# Patient Record
Sex: MALE | Race: WHITE | NOT HISPANIC OR LATINO | Employment: FULL TIME | URBAN - METROPOLITAN AREA
[De-identification: names, ages, dates, MRNs, and addresses within clinical notes are randomized per-mention and may not be internally consistent; named-entity substitution may affect disease eponyms.]

---

## 2017-01-18 ENCOUNTER — MYC MEDICAL ADVICE (OUTPATIENT)
Dept: FAMILY MEDICINE | Facility: CLINIC | Age: 52
End: 2017-01-18

## 2017-01-18 DIAGNOSIS — E34.9 HYPOTESTOSTERONISM: ICD-10-CM

## 2017-01-18 DIAGNOSIS — R53.83 OTHER FATIGUE: Primary | ICD-10-CM

## 2017-01-18 DIAGNOSIS — E03.9 HYPOTHYROIDISM, UNSPECIFIED TYPE: ICD-10-CM

## 2017-01-21 DIAGNOSIS — L70.9 ACNE: ICD-10-CM

## 2017-01-21 DIAGNOSIS — L71.9 ROSACEA: Primary | ICD-10-CM

## 2017-01-23 RX ORDER — MINOCYCLINE HYDROCHLORIDE 100 MG/1
100 CAPSULE ORAL DAILY
Qty: 60 CAPSULE | Refills: 5 | Status: SHIPPED | OUTPATIENT
Start: 2017-01-23 | End: 2017-10-12

## 2017-01-23 NOTE — TELEPHONE ENCOUNTER
minocycline      Last Written Prescription Date: 1/6/2016  Last Fill Quantity: 60,  # refills: 5   Last Office Visit with [facility], P or [facility] prescribing provider: 12/2/2016                                         Next 5 appointments (look out 90 days)     Jan 26, 2017  8:15 AM   [facility] visit with [facility]   [facility]    [address] [place] [id number]   [phone]                  Prescription approved per [facility] Refill Protocol.  Palak Dsouza RN

## 2017-01-26 DIAGNOSIS — E34.9 HYPOTESTOSTERONISM: ICD-10-CM

## 2017-01-26 DIAGNOSIS — E03.9 HYPOTHYROIDISM, UNSPECIFIED TYPE: ICD-10-CM

## 2017-01-26 DIAGNOSIS — R53.83 OTHER FATIGUE: ICD-10-CM

## 2017-01-26 LAB
CORTIS SERPL-MCNC: 6.6 UG/DL (ref 4–22)
T3FREE SERPL-MCNC: 3.3 PG/ML (ref 2.3–4.2)
T4 FREE SERPL-MCNC: 0.9 NG/DL (ref 0.76–1.46)
TSH SERPL DL<=0.05 MIU/L-ACNC: 16.18 MU/L (ref 0.4–4)

## 2017-01-26 PROCEDURE — 82088 ASSAY OF ALDOSTERONE: CPT | Mod: 90 | Performed by: FAMILY MEDICINE

## 2017-01-26 PROCEDURE — 99000 SPECIMEN HANDLING OFFICE-LAB: CPT | Performed by: FAMILY MEDICINE

## 2017-01-26 PROCEDURE — 84481 FREE ASSAY (FT-3): CPT | Performed by: FAMILY MEDICINE

## 2017-01-26 PROCEDURE — 82533 TOTAL CORTISOL: CPT | Performed by: FAMILY MEDICINE

## 2017-01-26 PROCEDURE — 84270 ASSAY OF SEX HORMONE GLOBUL: CPT | Performed by: FAMILY MEDICINE

## 2017-01-26 PROCEDURE — 84439 ASSAY OF FREE THYROXINE: CPT | Performed by: FAMILY MEDICINE

## 2017-01-26 PROCEDURE — 84443 ASSAY THYROID STIM HORMONE: CPT | Performed by: FAMILY MEDICINE

## 2017-01-26 PROCEDURE — 82627 DEHYDROEPIANDROSTERONE: CPT | Performed by: FAMILY MEDICINE

## 2017-01-26 PROCEDURE — 36415 COLL VENOUS BLD VENIPUNCTURE: CPT | Performed by: FAMILY MEDICINE

## 2017-01-26 PROCEDURE — 84403 ASSAY OF TOTAL TESTOSTERONE: CPT | Performed by: FAMILY MEDICINE

## 2017-01-27 LAB
ALDOST SERPL-MCNC: 27.6 NG/DL
DHEA-S SERPL-MCNC: 291 UG/DL (ref 80–560)

## 2017-01-29 LAB
SHBG SERPL-SCNC: 26 NMOL/L (ref 11–80)
TESTOST FREE SERPL-MCNC: 19.26 NG/DL (ref 4.7–24.4)
TESTOST SERPL-MCNC: 752 NG/DL (ref 240–950)

## 2017-03-22 ENCOUNTER — TELEPHONE (OUTPATIENT)
Dept: FAMILY MEDICINE | Facility: CLINIC | Age: 52
End: 2017-03-22

## 2017-03-22 DIAGNOSIS — E34.9 HYPOTESTOSTERONISM: ICD-10-CM

## 2017-03-22 DIAGNOSIS — E03.9 HYPOTHYROIDISM, UNSPECIFIED TYPE: Primary | ICD-10-CM

## 2017-03-22 DIAGNOSIS — E03.9 HYPOTHYROIDISM, UNSPECIFIED TYPE: ICD-10-CM

## 2017-03-22 DIAGNOSIS — E34.9 HYPOTESTOSTERONISM: Primary | ICD-10-CM

## 2017-03-22 LAB
CORTIS SERPL-MCNC: 8 UG/DL (ref 4–22)
T3FREE SERPL-MCNC: 3 PG/ML (ref 2.3–4.2)

## 2017-03-22 PROCEDURE — 84439 ASSAY OF FREE THYROXINE: CPT | Performed by: FAMILY MEDICINE

## 2017-03-22 PROCEDURE — 99000 SPECIMEN HANDLING OFFICE-LAB: CPT | Performed by: FAMILY MEDICINE

## 2017-03-22 PROCEDURE — 82627 DEHYDROEPIANDROSTERONE: CPT | Performed by: FAMILY MEDICINE

## 2017-03-22 PROCEDURE — 36415 COLL VENOUS BLD VENIPUNCTURE: CPT | Performed by: FAMILY MEDICINE

## 2017-03-22 PROCEDURE — 82088 ASSAY OF ALDOSTERONE: CPT | Mod: 90 | Performed by: FAMILY MEDICINE

## 2017-03-22 PROCEDURE — 82533 TOTAL CORTISOL: CPT | Performed by: FAMILY MEDICINE

## 2017-03-22 PROCEDURE — 84481 FREE ASSAY (FT-3): CPT | Performed by: FAMILY MEDICINE

## 2017-03-22 NOTE — TELEPHONE ENCOUNTER
Pt walking in requesting labs.  Labs ordered.    Nataly Carrasquillo RN    [facility]

## 2017-03-23 LAB
ALDOST SERPL-MCNC: 7.4 NG/DL
DHEA-S SERPL-MCNC: 152 UG/DL (ref 80–560)
T4 FREE SERPL-MCNC: 0.89 NG/DL (ref 0.76–1.46)

## 2017-05-15 ENCOUNTER — MYC MEDICAL ADVICE (OUTPATIENT)
Dept: FAMILY MEDICINE | Facility: CLINIC | Age: 52
End: 2017-05-15

## 2017-05-15 DIAGNOSIS — F41.1 GENERALIZED ANXIETY DISORDER: ICD-10-CM

## 2017-05-15 DIAGNOSIS — E03.9 HYPOTHYROIDISM, UNSPECIFIED TYPE: Primary | ICD-10-CM

## 2017-05-15 DIAGNOSIS — R53.83 OTHER FATIGUE: ICD-10-CM

## 2017-05-15 NOTE — LETTER
[facility] PRIOR [address] [address]  [phone]        June 23, 2017    Paco Fatima  [address]  [facility] [address]              Dear Paco Fatima    This is to remind you that your non-fasting labs are due.    You may call our office at [phone] to schedule an appointment.    Please disregard this notice if you have already had your labs drawn or made an appointment.        Sincerely,        Sonu Gaviria MD

## 2017-05-16 ENCOUNTER — MYC MEDICAL ADVICE (OUTPATIENT)
Dept: FAMILY MEDICINE | Facility: CLINIC | Age: 52
End: 2017-05-16

## 2017-05-17 NOTE — TELEPHONE ENCOUNTER
Pt sent another my chart for this - please review and advise     Thank you     Stacy Tipton RN, BSN  [facility]

## 2017-05-18 RX ORDER — LEVOTHYROXINE SODIUM 100 UG/1
100 TABLET ORAL DAILY
Qty: 90 TABLET | Refills: 1 | Status: SHIPPED | OUTPATIENT
Start: 2017-05-18 | End: 2017-08-01

## 2017-05-18 RX ORDER — LIOTHYRONINE SODIUM 25 UG/1
25-50 TABLET ORAL DAILY
Qty: 180 TABLET | Refills: 1 | Status: SHIPPED | OUTPATIENT
Start: 2017-05-18 | End: 2017-08-01

## 2017-05-18 RX ORDER — METHYLPREDNISOLONE 4 MG/1
4-12 TABLET ORAL DAILY
Qty: 180 TABLET | Refills: 0 | Status: SHIPPED | OUTPATIENT
Start: 2017-05-18 | End: 2017-08-01

## 2017-07-24 ENCOUNTER — MYC MEDICAL ADVICE (OUTPATIENT)
Dept: FAMILY MEDICINE | Facility: CLINIC | Age: 52
End: 2017-07-24

## 2017-07-25 DIAGNOSIS — R53.83 OTHER FATIGUE: ICD-10-CM

## 2017-07-25 DIAGNOSIS — E03.9 HYPOTHYROIDISM, UNSPECIFIED TYPE: ICD-10-CM

## 2017-07-25 DIAGNOSIS — F41.1 GENERALIZED ANXIETY DISORDER: ICD-10-CM

## 2017-07-25 LAB
CORTIS SERPL-MCNC: 2.8 UG/DL (ref 4–22)
T3FREE SERPL-MCNC: 2.1 PG/ML (ref 2.3–4.2)
T4 FREE SERPL-MCNC: 0.72 NG/DL (ref 0.76–1.46)
TSH SERPL DL<=0.05 MIU/L-ACNC: 0.1 MU/L (ref 0.4–4)

## 2017-07-25 PROCEDURE — 84443 ASSAY THYROID STIM HORMONE: CPT | Performed by: FAMILY MEDICINE

## 2017-07-25 PROCEDURE — 84481 FREE ASSAY (FT-3): CPT | Performed by: FAMILY MEDICINE

## 2017-07-25 PROCEDURE — 82627 DEHYDROEPIANDROSTERONE: CPT | Performed by: FAMILY MEDICINE

## 2017-07-25 PROCEDURE — 84439 ASSAY OF FREE THYROXINE: CPT | Performed by: FAMILY MEDICINE

## 2017-07-25 PROCEDURE — 82533 TOTAL CORTISOL: CPT | Performed by: FAMILY MEDICINE

## 2017-07-25 PROCEDURE — 36415 COLL VENOUS BLD VENIPUNCTURE: CPT | Performed by: FAMILY MEDICINE

## 2017-07-26 LAB — DHEA-S SERPL-MCNC: 153 UG/DL (ref 80–560)

## 2017-07-27 ENCOUNTER — MYC MEDICAL ADVICE (OUTPATIENT)
Dept: FAMILY MEDICINE | Facility: CLINIC | Age: 52
End: 2017-07-27

## 2017-07-27 NOTE — PROGRESS NOTES
Dear Paco,    Here is a summary of your recent test results:  -TSH (thyroid stimulating hormone) level is  Low but your hormone levels T3 and T4 are low too (they should be elevated with a low TSH)  This could be related to replacement medications.  What are you taking lately?    Also you cortisol is low.  Are you taking medrol now?    For additional lab test information, labtestsonline.org is an excellent reference.    In addition, here is a list of due or overdue Health Maintenance reminders:  Wellness Visit with your Primary Provider - yearly due on 06/24/2011  Colon Cancer Screening - every 10 years. due on 01/19/2016  Cholesterol Lab - yearly due on 03/21/2017  Basic Metabolic Lab - yearly due on 07/19/2017  Microalbumin Lab - yearly due on 07/19/2017    Please call us at [phone] (or use [employer]) to address the above recommendations if needed.           Thank you very much for trusting me and [facility].     Healthy regards,  José Miguel Gaviria MD

## 2017-07-28 ENCOUNTER — MYC MEDICAL ADVICE (OUTPATIENT)
Dept: FAMILY MEDICINE | Facility: CLINIC | Age: 52
End: 2017-07-28

## 2017-07-31 NOTE — PROGRESS NOTES
"  SUBJECTIVE:                                                    Paco Fatima is a 52 year old male who presents to clinic today for the following health issues:    Hyperlipidemia Follow-Up      Rate your low fat/cholesterol diet?: { :618882::\"good\"}    Taking statin?  { :204449::\"No\"}    Other lipid medications/supplements?:  { :467617::\"none\"}    Anxiety Follow-Up    Status since last visit: { :721101::\"No change\"}    Other associated symptoms:{ :242838::\"None\"}    Complicating factors:   Significant life event: { :859214::\"No\"}   Current substance abuse: { :001462::\"None\"}  Depression symptoms: { :132579::\"No\"}  DAY-7 SCORE 2/12/2013 5/16/2013 7/29/2013   Total Score 7 11 7       GAD7      Hypothyroidism Follow-up      Since last visit, patient describes the following symptoms: { :427047::\"Weight stable, no hair loss, no skin changes, no constipation, no loose stools\"}        Amount of exercise or physical activity: {Exercise frequency days per week:365143}    Problems taking medications regularly: {Med Problems:571604::\"No\"}    Medication side effects: {CHRONIC MED SIDE EFFECTS:546705::\"none\"}  Diet: { :377571}  {ACUTE Problem  - extended histories:549590}      Problem list and histories reviewed & adjusted, as indicated.  Additional history: as documented    ROS:  Constitutional, HEENT, cardiovascular, pulmonary, GI, , musculoskeletal, neuro, skin, endocrine and psych systems are negative, except as otherwise noted.    This document serves as a record of the services and decisions personally performed and made by Sonu Gaviria MD. It was created on his behalf by Malou Rubin, a trained medical scribe. The creation of this document is based on the provider's statements to the medical scribe.  Malou Rubin 8:19 AM 8/1/2017  OBJECTIVE:                                                    There were no vitals taken for this visit. There is no height or weight on file to calculate BMI.   GENERAL: " "healthy, alert, well nourished, well hydrated, no distress  HENT: ear canals- normal; TMs- normal; Nose- normal; Mouth- no ulcers, no lesions  NECK: no tenderness, no adenopathy, no asymmetry, no masses, no stiffness; thyroid- normal to palpation  RESP: lungs clear to auscultation - no rales, no rhonchi, no wheezes  CV: regular rates and rhythm, normal S1 S2, no S3 or S4 and no murmur, no click or rub -  ABDOMEN: soft, no tenderness, no  hepatosplenomegaly, no masses, normal bowel sounds  MS: extremities- no gross deformities noted, no edema  SKIN: no suspicious lesions, no rashes    Diagnostic test results:  {DIAGNOSTIC TEST RESULTS:552758::\"none \"}     ASSESSMENT/PLAN:         There are no diagnoses linked to this encounter.    Risks, benefits and alternatives of treatments discussed. Plan agreed on.      Followup:***    Will call, return to clinic, or go to ED if worsening or symptoms not improving as discussed.    See patient instructions.     {Quality Requirements:392304}    Health Maintenance Topics with due status: Overdue       Topic Date Due    WELLNESS VISIT Q1 YR 06/24/2011    COLON CANCER SCREEN (SYSTEM ASSIGNED) 01/19/2016    LIPID MONITORING Q1 YEAR 03/21/2017    BMP Q1 YR 07/19/2017    MICROALBUMIN Q1 YEAR 07/19/2017       Health maintenance reviewed/updated? Yes    The information in this document, created by a scribe for me, accurately reflects the services I personally performed and the decisions made by me. I have reviewed and approved this document for accuracy.      José Miguel Gaviria MD   "

## 2017-08-01 ENCOUNTER — OFFICE VISIT (OUTPATIENT)
Dept: FAMILY MEDICINE | Facility: CLINIC | Age: 52
End: 2017-08-01
Payer: COMMERCIAL

## 2017-08-01 VITALS
BODY MASS INDEX: 30.48 KG/M2 | SYSTOLIC BLOOD PRESSURE: 120 MMHG | HEIGHT: 73 IN | HEART RATE: 74 BPM | TEMPERATURE: 98.8 F | DIASTOLIC BLOOD PRESSURE: 80 MMHG | WEIGHT: 230 LBS | OXYGEN SATURATION: 98 %

## 2017-08-01 DIAGNOSIS — E34.9 HYPOTESTOSTERONISM: ICD-10-CM

## 2017-08-01 DIAGNOSIS — Z12.11 SCREEN FOR COLON CANCER: ICD-10-CM

## 2017-08-01 DIAGNOSIS — R53.83 OTHER FATIGUE: ICD-10-CM

## 2017-08-01 DIAGNOSIS — E03.9 HYPOTHYROIDISM, UNSPECIFIED TYPE: Primary | ICD-10-CM

## 2017-08-01 DIAGNOSIS — I10 ESSENTIAL HYPERTENSION WITH GOAL BLOOD PRESSURE LESS THAN 140/90: ICD-10-CM

## 2017-08-01 PROCEDURE — 99214 OFFICE O/P EST MOD 30 MIN: CPT | Performed by: FAMILY MEDICINE

## 2017-08-01 RX ORDER — METHYLPREDNISOLONE 4 MG/1
4-8 TABLET ORAL DAILY
Qty: 180 TABLET | Refills: 0 | Status: SHIPPED | OUTPATIENT
Start: 2017-08-01 | End: 2017-09-08 | Stop reason: ALTCHOICE

## 2017-08-01 RX ORDER — LEVOTHYROXINE SODIUM 150 UG/1
150 TABLET ORAL DAILY
Qty: 90 TABLET | Refills: 1 | Status: SHIPPED | OUTPATIENT
Start: 2017-08-01 | End: 2018-01-30

## 2017-08-01 RX ORDER — LIOTHYRONINE SODIUM 5 UG/1
20-25 TABLET ORAL DAILY
Qty: 180 TABLET | Refills: 3 | Status: SHIPPED | OUTPATIENT
Start: 2017-08-01 | End: 2018-03-12

## 2017-08-01 NOTE — PROGRESS NOTES
SUBJECTIVE:                                                    Paco Fatima is a 52 year old male who presents to clinic today for the following health issues:    Hyperlipidemia Follow-Up    Rate your low fat/cholesterol diet?: good    Taking statin?  Yes, no muscle aches from statin    Other lipid medications/supplements?:  none    Anxiety Follow-Up    Status since last visit: No change    Other associated symptoms:None    Complicating factors:   Significant life event: No   Current substance abuse: None  Depression symptoms: No  DAY-7 SCORE 2/12/2013 5/16/2013 7/29/2013   Total Score 7 11 7       GAD7      Hypothyroidism Follow-up    Since last visit, patient describes the following symptoms: Weight stable, no hair loss, no skin changes, no constipation, no loose stools    The patient's last blood draw showed decreased T4, T3, and TSH levels and he did not take his meds the day of the test.  He feels improved overall.  He desires to take his cytomel spread out throughout the day.     Fatigue - The patient has been taking 2 tablets of 4 mg of Medrol daily. He states that he has had increased energy and is hoping for a refill on the medication.  No SE that he is aware of.       Amount of exercise or physical activity: ocassional    Problems taking medications regularly: No    Medication side effects: none  Diet: low fat/cholesterol - He has been decreasing sugar and caffeine use.      Problem list and histories reviewed & adjusted, as indicated.  Additional history: as documented    ROS:  Constitutional, HEENT, cardiovascular, pulmonary, GI, , musculoskeletal, neuro, skin, endocrine and psych systems are negative, except as otherwise noted.    This document serves as a record of the services and decisions personally performed and made by Sonu Gaviria MD. It was created on his behalf by Malou Rubin, a trained medical scribe. The creation of this document is based on the provider's statements to the  "medical scribe.  Malou Rubin 12:08 PM 8/1/2017  OBJECTIVE:                                                    /80 (BP Location: Right arm, Patient Position: Chair, Cuff Size: Adult Large)  Pulse 74  Temp 98.8  F (37.1  C) (Oral)  Ht 1.854 m (6' 1\")  Wt 104.3 kg (230 lb)  SpO2 98%  BMI 30.34 kg/m2 Body mass index is 30.34 kg/(m^2).   GENERAL: healthy, alert, well nourished, well hydrated, no distress  HENT: ear canals- normal; TMs- normal; Nose- normal; Mouth- no ulcers, no lesions  NECK: no tenderness, no adenopathy, no asymmetry, no masses, no stiffness; thyroid- normal to palpation  RESP: lungs clear to auscultation - no rales, no rhonchi, no wheezes  CV: regular rates and rhythm, normal S1 S2, no S3 or S4 and no murmur, no click or rub -  ABDOMEN: soft, no tenderness, no  hepatosplenomegaly, no masses, normal bowel sounds  MS: extremities- no gross deformities noted, no edema  SKIN: no suspicious lesions, no rashes    Diagnostic test results:  Reviewed recent results      ASSESSMENT/PLAN:         Paco was seen today for recheck medication.    Diagnoses and all orders for this visit:      Hypothyroidism, unspecified type - controlled - continue medication.  -     liothyronine (CYTOMEL) 5 MCG tablet; Take 4-5 tablets (20-25 mcg) by mouth daily  -     levothyroxine (SYNTHROID/LEVOTHROID) 150 MCG tablet; Take 1 tablet (150 mcg) by mouth daily    Other fatigue - controlled - start tapering Medrol dose.  -     methylPREDNISolone (MEDROL) 4 MG tablet; Take 1-2 tablets (4-8 mg) by mouth daily Taper down on dose if possible  -     Comprehensive metabolic panel (BMP + Alb, Alk Phos, ALT, AST, Total. Bili, TP); Future    H/o Hypotestosteronism - symptoms are OK currently    HTN - stable      Screen for colon cancer - schedule colonoscopy  -     GASTROENTEROLOGY ADULT REF PROCEDURE ONLY        Risks, benefits and alternatives of treatments discussed. Plan agreed on.      Followup: As needed    Will call, " return to clinic, or go to ED if worsening or symptoms not improving as discussed.    See patient instructions.       Health Maintenance Topics with due status: Overdue       Topic Date Due    WELLNESS VISIT Q1 YR 06/24/2011    COLON CANCER SCREEN (SYSTEM ASSIGNED) 01/19/2016    LIPID MONITORING Q1 YEAR 03/21/2017    BMP Q1 YR 07/19/2017    MICROALBUMIN Q1 YEAR 07/19/2017       Health maintenance reviewed/updated? Yes    The information in this document, created by a scribe for me, accurately reflects the services I personally performed and the decisions made by me. I have reviewed and approved this document for accuracy.      José Miguel Gaviria MD

## 2017-08-01 NOTE — MR AVS SNAPSHOT
After Visit Summary   8/1/2017    Paco Fatima    MRN: [id number]           Patient Information     Date Of Birth          1965        Visit Information        Provider Department      8/1/2017 11:20 AM Sonu Gaviria MD [facility]        Today's Diagnoses     Screen for colon cancer    -  1    Hypothyroidism, unspecified type        Other fatigue        Hypotestosteronism           Follow-ups after your visit        Additional Services     GASTROENTEROLOGY ADULT REF PROCEDURE ONLY       Last Lab Result: Creatinine (mg/dL)       Date                     Value                 07/19/2016               1.04             ----------  Body mass index is 30.34 kg/(m^2).      Patient will be contacted to schedule procedure.     Please be aware that coverage of these services is subject to the terms and limitations of your health insurance plan.  Call member services at your health plan with any benefit or coverage questions.  Any procedures must be performed at a [facility] facility OR coordinated by your clinic's referral office.    Please bring the following with you to your appointment:    (1) Any X-Rays, CTs or MRIs which have been performed.  Contact the facility where they were done to arrange for  prior to your scheduled appointment.    (2) List of current medications   (3) This referral request   (4) Any documents/labs given to you for this referral                  Your next 10 appointments already scheduled     Aug 22, 2017  8:40 AM CDT   Jose Juant Yoon with Sonu Gaviria MD   [facility]    [address] [place] [address] [phone]              Future tests that were ordered for you today     Open Future Orders        Priority Expected Expires Ordered    Comprehensive metabolic panel (BMP + Alb, Alk Phos, ALT, AST, Total. Bili, TP) Routine  10/1/2017 8/1/2017            Who to contact     If  "you have questions or need follow up information about today's clinic visit or your schedule please contact [facility] directly at [phone].  Normal or non-critical lab and imaging results will be communicated to you by MyChart, letter or phone within 4 business days after the clinic has received the results. If you do not hear from us within 7 days, please contact the clinic through [employer] or phone. If you have a critical or abnormal lab result, we will notify you by phone as soon as possible.  Submit refill requests through [employer] or call your pharmacy and they will forward the refill request to us. Please allow 3 business days for your refill to be completed.          Additional Information About Your Visit        [employer] Information     [employer] gives you secure access to your electronic health record. If you see a primary care provider, you can also send messages to your care team and make appointments. If you have questions, please call your primary care clinic.  If you do not have a primary care provider, please call [phone] and they will assist you.        Care EveryWhere ID     This is your Care EveryWhere ID. This could be used by other organizations to access your [place] medical records  [id number]        Your Vitals Were     Pulse Temperature Height Pulse Oximetry BMI (Body Mass Index)       74 98.8  F (37.1  C) (Oral) 6' 1\" (1.854 m) 98% 30.34 kg/m2        Blood Pressure from Last 3 Encounters:   08/01/17 120/80   12/02/16 120/80   09/13/16 110/80    Weight from Last 3 Encounters:   08/01/17 230 lb (104.3 kg)   12/02/16 235 lb (106.6 kg)   07/19/16 230 lb (104.3 kg)              We Performed the Following     GASTROENTEROLOGY ADULT REF PROCEDURE ONLY          Today's Medication Changes          These changes are accurate as of: 8/1/17 12:25 PM.  If you have any questions, ask your nurse or doctor.               These medicines have changed or have updated prescriptions.  "       Dose/Directions    levothyroxine 150 MCG tablet   Commonly known as:  SYNTHROID/LEVOTHROID   This may have changed:    - medication strength  - how much to take   Used for:  Hypothyroidism, unspecified type   Changed by:  Sonu Gaviria MD        Dose:  150 mcg   Take 1 tablet (150 mcg) by mouth daily   Quantity:  90 tablet   Refills:  1       liothyronine 5 MCG tablet   Commonly known as:  CYTOMEL   This may have changed:    - medication strength  - how much to take  - additional instructions   Used for:  Hypothyroidism, unspecified type   Changed by:  Sonu Gaviria MD        Dose:  20-25 mcg   Take 4-5 tablets (20-25 mcg) by mouth daily   Quantity:  180 tablet   Refills:  3       methylPREDNISolone 4 MG tablet   Commonly known as:  MEDROL   This may have changed:    - how much to take  - additional instructions   Used for:  Other fatigue   Changed by:  Sonu Gaviria MD        Dose:  4-8 mg   Take 1-2 tablets (4-8 mg) by mouth daily Taper down on dose if possible   Quantity:  180 tablet   Refills:  0         Stop taking these medicines if you haven't already. Please contact your care team if you have questions.     exemestane 25 MG tablet   Commonly known as:  AROMASIN   Stopped by:  Sonu Gaviria MD           Toremifene Citrate 60 MG Tabs   Stopped by:  Sonu Gaviria MD                Where to get your medicines      These medications were sent to [facility] [place] [id number]     Phone:  [phone]     levothyroxine 150 MCG tablet    liothyronine 5 MCG tablet    methylPREDNISolone 4 MG tablet                Primary Care Provider Office Phone # Fax #    Sonu Gaviria [phone]       [address] [address]        Equal Access to Services     ADRIENNE SALMON AH: Meryl Fletcher, valerie rojo, pritesh naylor,  gary canasgemma baez'aan ah. So [facility] [phone].    ATENCIÓN: Si flora hutchison, tiene a sanon disposición servicios gratuitos de asistencia lingüística. Skyler gomez [phone].    We comply with applicable federal civil rights laws and [place] laws. We do not discriminate on the basis of race, color, national origin, age, disability sex, sexual orientation or gender identity.            Thank you!     Thank you for choosing [facility]  for your care. Our goal is always to provide you with excellent care. Hearing back from our patients is one way we can continue to improve our services. Please take a few minutes to complete the written survey that you may receive in the mail after your visit with us. Thank you!             Your Updated Medication List - Protect others around you: Learn how to safely use, store and throw away your medicines at www.disposemymeds.org.          This list is accurate as of: 8/1/17 12:25 PM.  Always use your most recent med list.                   Brand Name Dispense Instructions for use Diagnosis    levothyroxine 150 MCG tablet    SYNTHROID/LEVOTHROID    90 tablet    Take 1 tablet (150 mcg) by mouth daily    Hypothyroidism, unspecified type       liothyronine 5 MCG tablet    CYTOMEL    180 tablet    Take 4-5 tablets (20-25 mcg) by mouth daily    Hypothyroidism, unspecified type       methylPREDNISolone 4 MG tablet    MEDROL    180 tablet    Take 1-2 tablets (4-8 mg) by mouth daily Taper down on dose if possible    Other fatigue       minocycline 100 MG capsule    MINOCIN/DYNACIN    60 capsule    Take 1 capsule (100 mg) by mouth daily    Rosacea, Acne       omeprazole 40 MG capsule    priLOSEC    90 capsule    Take 1 capsule (40 mg) by mouth daily Take 30-60 minutes before a meal.    Dysphagia, unspecified type       propranolol 40 MG tablet    INDERAL    90 tablet    TAKE ONE TABLET BY MOUTH EVERY DAY AS NEEDED    Generalized anxiety disorder

## 2017-08-01 NOTE — NURSING NOTE
"Chief Complaint   Patient presents with     Recheck Medication       Initial /80 (BP Location: Right arm, Patient Position: Chair, Cuff Size: Adult Large)  Pulse 74  Temp 98.8  F (37.1  C) (Oral)  Ht 6' 1\" (1.854 m)  Wt 230 lb (104.3 kg)  SpO2 98%  BMI 30.34 kg/m2 Estimated body mass index is 30.34 kg/(m^2) as calculated from the following:    Height as of this encounter: 6' 1\" (1.854 m).    Weight as of this encounter: 230 lb (104.3 kg).  Medication Reconciliation: complete  "

## 2017-08-02 ENCOUNTER — TELEPHONE (OUTPATIENT)
Dept: FAMILY MEDICINE | Facility: CLINIC | Age: 52
End: 2017-08-02

## 2017-08-02 NOTE — TELEPHONE ENCOUNTER
First attempt. Not Scheduled at [facility]. Patient checking schedule and will call us back when they are ready to schedule. Gave the patient [facility]  phone number.

## 2017-09-05 ENCOUNTER — MYC MEDICAL ADVICE (OUTPATIENT)
Dept: FAMILY MEDICINE | Facility: CLINIC | Age: 52
End: 2017-09-05

## 2017-09-05 DIAGNOSIS — R53.83 OTHER FATIGUE: ICD-10-CM

## 2017-09-05 NOTE — TELEPHONE ENCOUNTER
Please see my chart message below     Please advise     Thank you     Stacy Tipton RN, BSN  [facility]

## 2017-09-08 RX ORDER — PREDNISONE 2.5 MG/1
7.5-1 TABLET ORAL DAILY
Qty: 120 TABLET | Refills: 11 | Status: SHIPPED | OUTPATIENT
Start: 2017-09-08 | End: 2017-10-08

## 2017-09-18 DIAGNOSIS — E03.9 HYPOTHYROIDISM, UNSPECIFIED TYPE: ICD-10-CM

## 2017-09-18 DIAGNOSIS — F41.1 GENERALIZED ANXIETY DISORDER: ICD-10-CM

## 2017-09-18 DIAGNOSIS — R53.83 OTHER FATIGUE: ICD-10-CM

## 2017-09-18 LAB
ALBUMIN SERPL-MCNC: 3.8 G/DL (ref 3.4–5)
ALP SERPL-CCNC: 93 U/L (ref 40–150)
ALT SERPL W P-5'-P-CCNC: 41 U/L (ref 0–70)
ANION GAP SERPL CALCULATED.3IONS-SCNC: 7 MMOL/L (ref 3–14)
AST SERPL W P-5'-P-CCNC: 13 U/L (ref 0–45)
BILIRUB SERPL-MCNC: 0.4 MG/DL (ref 0.2–1.3)
BUN SERPL-MCNC: 17 MG/DL (ref 7–30)
CALCIUM SERPL-MCNC: 9 MG/DL (ref 8.5–10.1)
CHLORIDE SERPL-SCNC: 105 MMOL/L (ref 94–109)
CO2 SERPL-SCNC: 26 MMOL/L (ref 20–32)
CORTIS SERPL-MCNC: 2 UG/DL (ref 4–22)
CREAT SERPL-MCNC: 1.05 MG/DL (ref 0.66–1.25)
GFR SERPL CREATININE-BSD FRML MDRD: 74 ML/MIN/1.7M2
GLUCOSE SERPL-MCNC: 86 MG/DL (ref 70–99)
POTASSIUM SERPL-SCNC: 3.7 MMOL/L (ref 3.4–5.3)
PROT SERPL-MCNC: 7.4 G/DL (ref 6.8–8.8)
SODIUM SERPL-SCNC: 138 MMOL/L (ref 133–144)
T3FREE SERPL-MCNC: 3.2 PG/ML (ref 2.3–4.2)
T4 FREE SERPL-MCNC: 1.17 NG/DL (ref 0.76–1.46)
TSH SERPL DL<=0.005 MIU/L-ACNC: <0.01 MU/L (ref 0.4–4)

## 2017-09-18 PROCEDURE — 82627 DEHYDROEPIANDROSTERONE: CPT | Performed by: FAMILY MEDICINE

## 2017-09-18 PROCEDURE — 84439 ASSAY OF FREE THYROXINE: CPT | Performed by: FAMILY MEDICINE

## 2017-09-18 PROCEDURE — 80053 COMPREHEN METABOLIC PANEL: CPT | Performed by: FAMILY MEDICINE

## 2017-09-18 PROCEDURE — 82533 TOTAL CORTISOL: CPT | Performed by: FAMILY MEDICINE

## 2017-09-18 PROCEDURE — 84443 ASSAY THYROID STIM HORMONE: CPT | Performed by: FAMILY MEDICINE

## 2017-09-18 PROCEDURE — 84481 FREE ASSAY (FT-3): CPT | Performed by: FAMILY MEDICINE

## 2017-09-18 PROCEDURE — 36415 COLL VENOUS BLD VENIPUNCTURE: CPT | Performed by: FAMILY MEDICINE

## 2017-09-19 LAB — DHEA-S SERPL-MCNC: 298 UG/DL (ref 80–560)

## 2017-09-20 NOTE — PROGRESS NOTES
Dear Paco,    Here is a summary of your recent test results:  -Liver and gallbladder tests are normal. (ALT,AST, Alk phos, bilirubin), kidney function is normal (Cr, GFR), Sodium is normal, Potassium is normal, Calcium is normal, Glucose is normal (diabetes screening test).   -TSH (thyroid stimulating hormone) is abnormal suggesting you are currently overreplaced.  ADVISE: lowering your medication dose.  -cortisol level is low and I would recommend you see an endocrinologist to get their opinion on next steps for your endocrine health.  Otherwise this should be rechecked in ~ 1-2 months.     For additional lab test information, labtestsonline.org is an excellent reference.    In addition, here is a list of due or overdue Health Maintenance reminders:  Wellness Visit with your Primary Provider - yearly due on 06/24/2011  Colon Cancer Screening - every 10 years. due on 01/19/2016  Cholesterol Lab - yearly due on 03/21/2017  Microalbumin Lab - yearly due on 07/19/2017  Flu Vaccine - yearly due on 09/01/2017    Please call us at [phone] (or use [employer]) to address the above recommendations if needed.           Thank you very much for trusting me and [facility].     Healthy regards,  José Miguel Gaviria MD

## 2017-09-21 ENCOUNTER — MYC MEDICAL ADVICE (OUTPATIENT)
Dept: FAMILY MEDICINE | Facility: CLINIC | Age: 52
End: 2017-09-21

## 2017-09-21 NOTE — TELEPHONE ENCOUNTER
GURINDERI    Forwarded to RL.  Please review patient's Mychart message and advise.    Nataly Vazquez, RN, BS, PHN

## 2017-10-12 ENCOUNTER — MYC REFILL (OUTPATIENT)
Dept: FAMILY MEDICINE | Facility: CLINIC | Age: 52
End: 2017-10-12

## 2017-10-12 DIAGNOSIS — L70.9 ACNE: ICD-10-CM

## 2017-10-12 DIAGNOSIS — L71.9 ROSACEA: ICD-10-CM

## 2017-10-12 RX ORDER — MINOCYCLINE HYDROCHLORIDE 100 MG/1
100 CAPSULE ORAL DAILY
Qty: 60 CAPSULE | Refills: 4 | Status: SHIPPED | OUTPATIENT
Start: 2017-10-12 | End: 2018-07-17

## 2017-10-12 NOTE — TELEPHONE ENCOUNTER
minocycline (MINOCIN/DYNACIN) 100 MG capsule      Last Written Prescription Date: 1/23/2017  Last Fill Quantity: 60 capsule,  # refills: 5   Last Office Visit with [facility], [facility] or [facility] prescribing provider: 8/22/2017

## 2017-10-12 NOTE — TELEPHONE ENCOUNTER
Message from [employer]:  Original authorizing provider: MD Paco Joiner would like a refill of the following medications:  minocycline (MINOCIN/DYNACIN) 100 MG capsule [Sonu Gaviria MD]    Preferred pharmacy: [facility] - [address]    Comment:

## 2017-11-13 ENCOUNTER — MYC MEDICAL ADVICE (OUTPATIENT)
Dept: FAMILY MEDICINE | Facility: CLINIC | Age: 52
End: 2017-11-13

## 2017-11-13 DIAGNOSIS — R79.89 LOW SERUM CORTISOL LEVEL: Primary | ICD-10-CM

## 2017-11-13 DIAGNOSIS — R53.83 OTHER FATIGUE: ICD-10-CM

## 2017-11-13 NOTE — LETTER
[facility] PRIOR [address] [address]  [phone]        January 22, 2018    Paco Fatima  [address]  [facility] [address]              Dear Paco Fatima    This is to remind you that your non-fasting lab work is due.    You may call our office at [phone] to schedule an appointment.    Please disregard this notice if you have already had your labs drawn or made an appointment.        Sincerely,        Sonu Gaviria M.D.

## 2017-11-13 NOTE — TELEPHONE ENCOUNTER
"Component      Latest Ref Rng & Units 3/22/2017 9/18/2017   Aldosterone       7.4    Cortisol Serum      4 - 22 ug/dL  2.0 (L)   DHEA Sulfate      80 - 560 ug/dL  298     Per 09/18/2017 Result note: \"cortisol level is low and I would recommend you see an endocrinologist to get their opinion on next steps for your endocrine health.  Otherwise this should be rechecked in ~ 1-2 months\"     Routing to PCP for further review/recommendations/orders.  Cortisol futured, DHEA and Aldosterone labs pended    Leigh Lee RN  [place] [facility]    "

## 2017-11-22 DIAGNOSIS — F41.1 GENERALIZED ANXIETY DISORDER: ICD-10-CM

## 2017-11-22 DIAGNOSIS — E03.9 HYPOTHYROIDISM, UNSPECIFIED TYPE: ICD-10-CM

## 2017-11-22 DIAGNOSIS — R53.83 OTHER FATIGUE: ICD-10-CM

## 2017-11-22 LAB
CORTIS SERPL-MCNC: 17.6 UG/DL (ref 4–22)
T3FREE SERPL-MCNC: 3.1 PG/ML (ref 2.3–4.2)
T4 FREE SERPL-MCNC: 1.18 NG/DL (ref 0.76–1.46)
TSH SERPL DL<=0.005 MIU/L-ACNC: 0.08 MU/L (ref 0.4–4)

## 2017-11-22 PROCEDURE — 84443 ASSAY THYROID STIM HORMONE: CPT | Performed by: FAMILY MEDICINE

## 2017-11-22 PROCEDURE — 82627 DEHYDROEPIANDROSTERONE: CPT | Performed by: FAMILY MEDICINE

## 2017-11-22 PROCEDURE — 36415 COLL VENOUS BLD VENIPUNCTURE: CPT | Performed by: FAMILY MEDICINE

## 2017-11-22 PROCEDURE — 84439 ASSAY OF FREE THYROXINE: CPT | Performed by: FAMILY MEDICINE

## 2017-11-22 PROCEDURE — 82533 TOTAL CORTISOL: CPT | Performed by: FAMILY MEDICINE

## 2017-11-22 PROCEDURE — 84481 FREE ASSAY (FT-3): CPT | Performed by: FAMILY MEDICINE

## 2017-11-24 LAB — DHEA-S SERPL-MCNC: 262 UG/DL (ref 80–560)

## 2017-11-30 NOTE — PROGRESS NOTES
Dear Paco,    Here is a summary of your recent test results:  -All of your labs are normal except your TSH shows you are a bit overreplaced for your thyroid - you could go down to 137 mcg daily and recheck ithis in ~ 2 months - Please let me know if you would like me to send in a prescription.     For additional lab test information, labtestsonline.org is an excellent reference.    In addition, here is a list of due or overdue Health Maintenance reminders:  Wellness Visit with your Primary Provider - yearly due on 06/24/2011  Colon Cancer Screening - every 10 years. due on 01/19/2016  Cholesterol Lab - yearly due on 03/21/2017  Microalbumin Lab - yearly due on 07/19/2017  Flu Vaccine - yearly due on 09/01/2017    Please call us at [phone] (or use [employer]) to address the above recommendations if needed.           Thank you very much for trusting me and [facility].     Healthy regards,  José Miguel Gaviria MD

## 2017-12-04 NOTE — PROGRESS NOTES
SUBJECTIVE:                                                    Paco Fatima is a 52 year old male who presents to clinic today for the following health issues:    Joint Pain    Onset: on and off for 1 year- worse over last month    Description:   Location: left knee  Character: Dull ache and Fullness    Intensity: moderate    Progression of Symptoms: worse    Accompanying Signs & Symptoms:  Other symptoms: swelling    History:   Previous similar pain: no       Precipitating factors:   Trauma or overuse: no     Alleviating factors:  Improved by: acetaminophen and Ibuprofen    Therapies Tried and outcome: ice and heat does not help- advil helps the most    - Pt has been experiencing left knee pain for the past year, with it worsening over the last month. Typically the pain would dissipate after some time, but has persisted now for the last month. There has been swelling in the area as well, but he denies any trauma or overuse. Paco has found no alleviation with ice or heat, but does find pain relief with Advil or Tylenol.     - Pt has some mild joint pain in his left wrist, right knee and right ankle as well.       GERD -- Pt has persistent gastric reflux and intermittently has difficulty swallowing, but is able to treat symptoms with omeprazole.       Problem list and histories reviewed & adjusted, as indicated.  Additional history: as documented    ROS:  Constitutional, HEENT, cardiovascular, pulmonary, GI, , musculoskeletal, neuro, skin, endocrine and psych systems are negative, except as otherwise noted.    This document serves as a record of the services and decisions personally performed and made by Sonu Gaviria MD. It was created on her behalf by Ryann Marie, a trained medical scribe. The creation of this document is based the provider's statements to the medical scribe.  Scribe Ryann Marie 9:25 AM, December 5, 2017    OBJECTIVE:                                                    /80 (BP  "Location: Left arm, Patient Position: Sitting, Cuff Size: Adult Large)  Pulse 76  Temp 98.3  F (36.8  C) (Oral)  Ht 1.854 m (6' 1\")  Wt 106.6 kg (235 lb)  SpO2 99%  BMI 31 kg/m2 Body mass index is 31 kg/(m^2).   GENERAL: healthy, alert, well nourished, well hydrated, no distress  NECK: no tenderness, no adenopathy, no asymmetry, no masses, no stiffness; thyroid- normal to palpation  RESP: lungs clear to auscultation - no rales, no rhonchi, no wheezes  CV: regular rates and rhythm, normal S1 S2, no S3 or S4 and no murmur, no click or rub -  ABDOMEN: soft, no tenderness, no  hepatosplenomegaly, no masses, normal bowel sounds  MS: Left wrist - mild synovitis tenderness; Left knee - no instability, pain with rotation; otherwise normal extremities- no gross deformities noted, no edema  SKIN: no suspicious lesions, no rashes  PSYCH: Alert and oriented times 3; speech- coherent , normal rate and volume; able to articulate logical thoughts, able to abstract reason, no tangential thoughts, no hallucinations or delusions, affect- normal  Diagnostic test results:  none      ASSESSMENT/PLAN:         Paco was seen today for musculoskeletal problem.    Diagnoses and all orders for this visit:    Polyarthralgia - Labs pending; Pt has been experiencing joint pain in multiple locations, with the worst being his left knee; Referral given for arthritis specialist, Pt will see for further treatment  -     Uric acid  -     Rheumatoid factor  -     CRP, inflammation  -     Anti Nuclear Che IgG by IFA with Reflex  -     **Lyme Disease Che with reflex to WB Serum FUTURE 14d  -     RHEUMATOLOGY REFERRAL    Gastroesophageal reflux disease without esophagitis/Dysphagia, unspecified type - Pt has experienced GERD & difficulty swallowing; Referral given, Pt will schedule appointment  -     GASTROENTEROLOGY ADULT REF PROCEDURE ONLY    Screen for colon cancer - Referral given, Pt will schedule  -     GASTROENTEROLOGY ADULT REF PROCEDURE " "ONLY    Need for prophylactic vaccination and inoculation against influenza  -     HC FLU VAC PRESRV FREE QUAD SPLIT VIR 3+YRS IM  [81443]  -          ADMIN VACCINE, FIRST [49200]        Risks, benefits and alternatives of treatments discussed. Plan agreed on.      Followup: Return to clinic as needed    Will call, return to clinic, or go to ED if worsening or symptoms not improving as discussed.    See patient instructions.       BMI:   Estimated body mass index is 31 kg/(m^2) as calculated from the following:    Height as of this encounter: 1.854 m (6' 1\").    Weight as of this encounter: 106.6 kg (235 lb).   Weight management plan: Discussed healthy diet and exercise guidelines and patient will follow up in 12 months in clinic to re-evaluate.          The information in this document, created by the medical scribe for me, accurately reflects the services I personally performed and the decisions made by me. I have reviewed and approved this document for accuracy prior to leaving the patient care area.  9:25 AM, 12/05/17        José Miguel Gaviria MD   Pager: [phone]    "

## 2017-12-05 ENCOUNTER — OFFICE VISIT (OUTPATIENT)
Dept: FAMILY MEDICINE | Facility: CLINIC | Age: 52
End: 2017-12-05
Payer: COMMERCIAL

## 2017-12-05 VITALS
HEART RATE: 76 BPM | HEIGHT: 73 IN | BODY MASS INDEX: 31.14 KG/M2 | WEIGHT: 235 LBS | DIASTOLIC BLOOD PRESSURE: 80 MMHG | TEMPERATURE: 98.3 F | OXYGEN SATURATION: 99 % | SYSTOLIC BLOOD PRESSURE: 120 MMHG

## 2017-12-05 DIAGNOSIS — Z12.11 SCREEN FOR COLON CANCER: ICD-10-CM

## 2017-12-05 DIAGNOSIS — M25.50 POLYARTHRALGIA: Primary | ICD-10-CM

## 2017-12-05 DIAGNOSIS — K21.9 GASTROESOPHAGEAL REFLUX DISEASE WITHOUT ESOPHAGITIS: ICD-10-CM

## 2017-12-05 DIAGNOSIS — R13.10 DYSPHAGIA, UNSPECIFIED TYPE: ICD-10-CM

## 2017-12-05 DIAGNOSIS — Z23 NEED FOR PROPHYLACTIC VACCINATION AND INOCULATION AGAINST INFLUENZA: ICD-10-CM

## 2017-12-05 LAB
CRP SERPL-MCNC: 10 MG/L (ref 0–8)
URATE SERPL-MCNC: 5.1 MG/DL (ref 3.5–7.2)

## 2017-12-05 PROCEDURE — 90686 IIV4 VACC NO PRSV 0.5 ML IM: CPT | Performed by: FAMILY MEDICINE

## 2017-12-05 PROCEDURE — 99214 OFFICE O/P EST MOD 30 MIN: CPT | Mod: 25 | Performed by: FAMILY MEDICINE

## 2017-12-05 PROCEDURE — 86038 ANTINUCLEAR ANTIBODIES: CPT | Performed by: FAMILY MEDICINE

## 2017-12-05 PROCEDURE — 86431 RHEUMATOID FACTOR QUANT: CPT | Performed by: FAMILY MEDICINE

## 2017-12-05 PROCEDURE — 86140 C-REACTIVE PROTEIN: CPT | Performed by: FAMILY MEDICINE

## 2017-12-05 PROCEDURE — 90471 IMMUNIZATION ADMIN: CPT | Performed by: FAMILY MEDICINE

## 2017-12-05 PROCEDURE — 86618 LYME DISEASE ANTIBODY: CPT | Performed by: FAMILY MEDICINE

## 2017-12-05 PROCEDURE — 84550 ASSAY OF BLOOD/URIC ACID: CPT | Performed by: FAMILY MEDICINE

## 2017-12-05 PROCEDURE — 36415 COLL VENOUS BLD VENIPUNCTURE: CPT | Performed by: FAMILY MEDICINE

## 2017-12-05 NOTE — MR AVS SNAPSHOT
After Visit Summary   12/5/2017    Paco Fatima    MRN: [id number]           Patient Information     Date Of Birth          1965        Visit Information        Provider Department      12/5/2017 8:40 AM Sonu Gaviria MD [facility]        Today's Diagnoses     Polyarthralgia    -  1    Screen for colon cancer        Need for prophylactic vaccination and inoculation against influenza        Gastroesophageal reflux disease without esophagitis        Dysphagia, unspecified type           Follow-ups after your visit        Additional Services     GASTROENTEROLOGY ADULT REF PROCEDURE ONLY       Last Lab Result: Creatinine (mg/dL)       Date                     Value                 09/18/2017               1.05             ----------  Body mass index is 31 kg/(m^2).     Needed:  No  Language:  English    Patient will be contacted to schedule procedure.     Please be aware that coverage of these services is subject to the terms and limitations of your health insurance plan.  Call member services at your health plan with any benefit or coverage questions.  Any procedures must be performed at a [facility] facility OR coordinated by your clinic's referral office.    Please bring the following with you to your appointment:    (1) Any X-Rays, CTs or MRIs which have been performed.  Contact the facility where they were done to arrange for  prior to your scheduled appointment.    (2) List of current medications   (3) This referral request   (4) Any documents/labs given to you for this referral            RHEUMATOLOGY REFERRAL       Your provider has referred you to:   [facility]: Rheumatology Clinic [facility] [phone]   [email or web address]    [facility] [place] [phone]   [email or web address]    Please be aware that coverage of these services is subject to the terms and limitations of your  "health insurance plan.  Call member services at your health plan with any benefit or coverage questions.      Please bring the following with you to your appointment:    (1) Any X-Rays, CTs or MRIs which have been performed.  Contact the facility where they were done to arrange for  prior to your scheduled appointment.    (2) List of current medications   (3) This referral request   (4) Any documents/labs given to you for this referral                  Who to contact     If you have questions or need follow up information about today's clinic visit or your schedule please contact [facility] directly at [phone].  Normal or non-critical lab and imaging results will be communicated to you by MyChart, letter or phone within 4 business days after the clinic has received the results. If you do not hear from us within 7 days, please contact the clinic through [employer] or phone. If you have a critical or abnormal lab result, we will notify you by phone as soon as possible.  Submit refill requests through Mer[employer] or call your pharmacy and they will forward the refill request to us. Please allow 3 business days for your refill to be completed.          Additional Information About Your Visit        MyChart Information     M[employer]u gives you secure access to your electronic health record. If you see a primary care provider, you can also send messages to your care team and make appointments. If you have questions, please call your primary care clinic.  If you do not have a primary care provider, please call [phone] and they will assist you.        Care EveryWhere ID     This is your Care EveryWhere ID. This could be used by other organizations to access your [facility] medical records  [id number]        Your Vitals Were     Pulse Temperature Height Pulse Oximetry BMI (Body Mass Index)       76 98.3  F (36.8  C) (Oral) 6' 1\" (1.854 m) 99% 31 kg/m2        Blood Pressure from Last 3 Encounters: "   12/05/17 120/80   08/01/17 120/80   12/02/16 120/80    Weight from Last 3 Encounters:   12/05/17 235 lb (106.6 kg)   08/01/17 230 lb (104.3 kg)   12/02/16 235 lb (106.6 kg)              We Performed the Following          ADMIN VACCINE, FIRST [63123]     **Lyme Disease Che with reflex to WB Serum FUTURE 14d     Anti Nuclear Che IgG by IFA with Reflex     CRP, inflammation     GASTROENTEROLOGY ADULT REF PROCEDURE ONLY     HC FLU VAC PRESRV FREE QUAD SPLIT VIR 3+YRS IM  [[id number]]     Rheumatoid factor     RHEUMATOLOGY REFERRAL     Uric acid        Primary Care Provider Office Phone # Fax #    Sonu Gaviria [phone]       [address] [place] [address]        Equal Access to Services     ADRIENNE SALMON : Hadii aad ku hadasho Soomaali, waaxda luqadaha, qaybta kaalmada adeegyada, gary parks . So [facility] [phone].    ATENCIÓN: Si habla español, tiene a sanon disposición servicios gratuitos de asistencia lingüística. Llame al [phone].    We comply with applicable federal civil rights laws and [place] laws. We do not discriminate on the basis of race, color, national origin, age, disability, sex, sexual orientation, or gender identity.            Thank you!     Thank you for choosing [facility]  for your care. Our goal is always to provide you with excellent care. Hearing back from our patients is one way we can continue to improve our services. Please take a few minutes to complete the written survey that you may receive in the mail after your visit with us. Thank you!             Your Updated Medication List - Protect others around you: Learn how to safely use, store and throw away your medicines at www.disposemymeds.org.          This list is accurate as of: 12/5/17  9:43 AM.  Always use your most recent med list.                   Brand Name Dispense Instructions for use Diagnosis    levothyroxine 150 MCG tablet    SYNTHROID/LEVOTHROID     90 tablet    Take 1 tablet (150 mcg) by mouth daily    Hypothyroidism, unspecified type       liothyronine 5 MCG tablet    CYTOMEL    180 tablet    Take 4-5 tablets (20-25 mcg) by mouth daily    Hypothyroidism, unspecified type       minocycline 100 MG capsule    MINOCIN/DYNACIN    60 capsule    Take 1 capsule (100 mg) by mouth daily    Rosacea, Acne       omeprazole 40 MG capsule    priLOSEC    90 capsule    Take 1 capsule (40 mg) by mouth daily Take 30-60 minutes before a meal.    Dysphagia, unspecified type       propranolol 40 MG tablet    INDERAL    90 tablet    TAKE ONE TABLET BY MOUTH EVERY DAY AS NEEDED    Generalized anxiety disorder

## 2017-12-06 LAB
ANA SER QL IF: NEGATIVE
B BURGDOR IGG+IGM SER QL: 0.8 (ref 0–0.89)
RHEUMATOID FACT SER NEPH-ACNC: <20 IU/ML (ref 0–20)

## 2017-12-08 NOTE — PROGRESS NOTES
Dear Paco,    Here is a summary of your recent test results:  -All of your labs are essentially normal - you can see a joint doctor (rheumatologist) next if needed.    For additional lab test information, labtestsonline.org is an excellent reference.             Thank you very much for trusting me and [facility].     Healthy regards,  José Miguel Gaviria MD

## 2018-01-22 ENCOUNTER — MYC MEDICAL ADVICE (OUTPATIENT)
Dept: FAMILY MEDICINE | Facility: CLINIC | Age: 53
End: 2018-01-22

## 2018-01-27 DIAGNOSIS — R53.83 OTHER FATIGUE: ICD-10-CM

## 2018-01-27 DIAGNOSIS — F41.1 GENERALIZED ANXIETY DISORDER: ICD-10-CM

## 2018-01-27 DIAGNOSIS — E03.9 HYPOTHYROIDISM, UNSPECIFIED TYPE: ICD-10-CM

## 2018-01-27 LAB
CORTIS SERPL-MCNC: 10.2 UG/DL (ref 4–22)
T3FREE SERPL-MCNC: 4.1 PG/ML (ref 2.3–4.2)
T4 FREE SERPL-MCNC: 1.05 NG/DL (ref 0.76–1.46)
TSH SERPL DL<=0.005 MIU/L-ACNC: 0.01 MU/L (ref 0.4–4)

## 2018-01-27 PROCEDURE — 99000 SPECIMEN HANDLING OFFICE-LAB: CPT | Performed by: FAMILY MEDICINE

## 2018-01-27 PROCEDURE — 84481 FREE ASSAY (FT-3): CPT | Performed by: FAMILY MEDICINE

## 2018-01-27 PROCEDURE — 82088 ASSAY OF ALDOSTERONE: CPT | Mod: 90 | Performed by: FAMILY MEDICINE

## 2018-01-27 PROCEDURE — 84443 ASSAY THYROID STIM HORMONE: CPT | Performed by: FAMILY MEDICINE

## 2018-01-27 PROCEDURE — 36415 COLL VENOUS BLD VENIPUNCTURE: CPT | Performed by: FAMILY MEDICINE

## 2018-01-27 PROCEDURE — 84439 ASSAY OF FREE THYROXINE: CPT | Performed by: FAMILY MEDICINE

## 2018-01-27 PROCEDURE — 82627 DEHYDROEPIANDROSTERONE: CPT | Performed by: FAMILY MEDICINE

## 2018-01-27 PROCEDURE — 82533 TOTAL CORTISOL: CPT | Performed by: FAMILY MEDICINE

## 2018-01-29 LAB — DHEA-S SERPL-MCNC: 37 UG/DL (ref 80–560)

## 2018-01-30 DIAGNOSIS — E03.9 HYPOTHYROIDISM, UNSPECIFIED TYPE: ICD-10-CM

## 2018-01-30 LAB — ALDOST SERPL-MCNC: 13.2 NG/DL

## 2018-01-30 RX ORDER — LEVOTHYROXINE SODIUM 137 UG/1
137 TABLET ORAL DAILY
Qty: 90 TABLET | Refills: 1 | Status: SHIPPED | OUTPATIENT
Start: 2018-01-30 | End: 2018-07-17

## 2018-03-12 ENCOUNTER — MYC REFILL (OUTPATIENT)
Dept: FAMILY MEDICINE | Facility: CLINIC | Age: 53
End: 2018-03-12

## 2018-03-12 DIAGNOSIS — E03.9 HYPOTHYROIDISM, UNSPECIFIED TYPE: ICD-10-CM

## 2018-03-13 DIAGNOSIS — E03.9 HYPOTHYROIDISM, UNSPECIFIED TYPE: ICD-10-CM

## 2018-03-14 RX ORDER — LIOTHYRONINE SODIUM 5 UG/1
TABLET ORAL
Qty: 180 TABLET | Refills: 3 | OUTPATIENT
Start: 2018-03-14

## 2018-03-14 RX ORDER — LIOTHYRONINE SODIUM 5 UG/1
20-25 TABLET ORAL DAILY
Qty: 180 TABLET | Refills: 0 | Status: SHIPPED | OUTPATIENT
Start: 2018-03-14 | End: 2018-05-04

## 2018-03-14 NOTE — TELEPHONE ENCOUNTER
"Requested Prescriptions   Pending Prescriptions Disp Refills     liothyronine (CYTOMEL) 5 MCG tablet [Pharmacy Med Name: LIOTHYRONINE SODIUM 5MCG TABS] 180 tablet 3     Sig: TAKE 4 TO 5 TABLETS BY MOUTH DAILY    Thyroid Protocol Failed    3/13/2018  5:30 PM       Failed - Normal TSH on file in past 12 months    Recent Labs   Lab Test  01/27/18   0920   TSH  0.01*             Passed - Patient is 12 years or older       Passed - Recent (12 mo) or future (30 days) visit within the authorizing provider's specialty    Patient had office visit in the last 12 months or has a visit in the next 30 days with authorizing provider or within the authorizing provider's specialty.  See \"Patient Info\" tab in inbasket, or \"Choose Columns\" in Meds & Orders section of the refill encounter.              "

## 2018-03-14 NOTE — TELEPHONE ENCOUNTER
"Cytomel  Last Written Prescription Date:  8/01/2017  Last Fill Quantity: 180,  # refills: 3   Last office visit: 12/5/2017 with prescribing provider:  Dr. Sonu Gaviria   Future Office Visit:   Next 5 appointments (look out 90 days)     Mar 26, 2018  7:40 AM CDT   Carisa Nettles with Sonu Gaviria MD   [facility]    [address] [place] [phone] [phone]                 Requested Prescriptions   Pending Prescriptions Disp Refills     liothyronine (CYTOMEL) 5 MCG tablet 180 tablet 3     Sig: Take 4-5 tablets (20-25 mcg) by mouth daily    Thyroid Protocol Failed    3/12/2018 11:03 AM       Failed - Normal TSH on file in past 12 months    Recent Labs   Lab Test  01/27/18   0920   TSH  0.01*             Passed - Patient is 12 years or older       Passed - Recent (12 mo) or future (30 days) visit within the authorizing provider's specialty    Patient had office visit in the last 12 months or has a visit in the next 30 days with authorizing provider or within the authorizing provider's specialty.  See \"Patient Info\" tab in inbasket, or \"Choose Columns\" in Meds & Orders section of the refill encounter.            Estela Rowe CMA  "
Has appt scheduled.    Lalitha Bills RN  [facility]  [phone]    
Message from [employer]:  Original authorizing provider: MD Paco Joiner would like a refill of the following medications:  liothyronine (CYTOMEL) 5 MCG tablet [Sonu Gaviria MD]    Preferred pharmacy: [address]    Comment:  Doing well on this. Have appt. with Dr. Gaviria on 3/26 to discuss meds and do follow up labs, but need this refill now. Thanks.  
normal...

## 2018-03-22 ENCOUNTER — TELEPHONE (OUTPATIENT)
Dept: LAB | Facility: CLINIC | Age: 53
End: 2018-03-22

## 2018-03-22 NOTE — TELEPHONE ENCOUNTER
Attempt # 1 to [phone].    Left non-detailed VM for patient to call back.    EDMOND Aly, RN, PHN  [facility]

## 2018-03-22 NOTE — LETTER
[address] [address]  Phone: [phone]        March 26, 2018      Paco Fatima                                                                                                                                [address]  [facility] [address]            Dear Mr. Fatima,    We have been trying to reach you regarding your lab request.  Dr. Esparza reported it was ok to add the Aldosterone, but wanted to review the indication with you.    Please call the clinic and speak to any triage nurse regarding this.      Thank you,          Rudolph Esparza M.D.

## 2018-03-22 NOTE — TELEPHONE ENCOUNTER
ALL ORDERS ARE IN, PT STATES HE ALSO NEEDS AN ALDOSTERONE LEVEL DRAWN.  PLEASE ADD TO ORDERS IF NEEDED.

## 2018-03-23 NOTE — TELEPHONE ENCOUNTER
Attempt #2  Called # below - Left a non-detailed message to call back and speak with any triage nurse.    Leigh Lee RN  [place] [facility]

## 2018-03-26 NOTE — TELEPHONE ENCOUNTER
Pt called back and will be here tomorrow for lab.    Adrenal glands and cortisol have fluctuations.  Pt needs to recheck these about every 6 weeks to 6 months.     There are standing labs in pts chart. Pt noted at this time not checking the Aldosterone is fine since it was ok in January.    The patient indicates understanding of these issues and agrees with the plan.  Palak Dsouza RN  [facility]

## 2018-03-26 NOTE — TELEPHONE ENCOUNTER
Attempt #3.  Radha Dsouza contacted [place] [facility] on 03/26/18 and left a message. If patient calls back please contact RN team.  Letter sent, final attempt.    Palak Dsouza RN  [place] [facility]

## 2018-03-27 DIAGNOSIS — F41.1 GENERALIZED ANXIETY DISORDER: ICD-10-CM

## 2018-03-27 DIAGNOSIS — E03.9 HYPOTHYROIDISM, UNSPECIFIED TYPE: Primary | ICD-10-CM

## 2018-03-27 DIAGNOSIS — R53.83 OTHER FATIGUE: ICD-10-CM

## 2018-03-27 LAB
CORTIS SERPL-MCNC: 11.6 UG/DL (ref 4–22)
T3FREE SERPL-MCNC: 4 PG/ML (ref 2.3–4.2)
T4 FREE SERPL-MCNC: 1.18 NG/DL (ref 0.76–1.46)
TSH SERPL DL<=0.005 MIU/L-ACNC: 0.01 MU/L (ref 0.4–4)

## 2018-03-27 PROCEDURE — 84443 ASSAY THYROID STIM HORMONE: CPT | Performed by: FAMILY MEDICINE

## 2018-03-27 PROCEDURE — 84439 ASSAY OF FREE THYROXINE: CPT | Performed by: FAMILY MEDICINE

## 2018-03-27 PROCEDURE — 36415 COLL VENOUS BLD VENIPUNCTURE: CPT | Performed by: FAMILY MEDICINE

## 2018-03-27 PROCEDURE — 82533 TOTAL CORTISOL: CPT | Performed by: FAMILY MEDICINE

## 2018-03-27 PROCEDURE — 84481 FREE ASSAY (FT-3): CPT | Performed by: FAMILY MEDICINE

## 2018-03-27 PROCEDURE — 82627 DEHYDROEPIANDROSTERONE: CPT | Performed by: FAMILY MEDICINE

## 2018-03-28 LAB — DHEA-S SERPL-MCNC: 437 UG/DL (ref 80–560)

## 2018-03-29 NOTE — PROGRESS NOTES
Dear Paco,    Here is a summary of your recent test results:  -overall your labs look OK - the thyroid is likely overreplaced with the low TSH but the T4 and T3 levels are OK (upper limit of normal)    For additional lab test information, labtestsonline.org is an excellent reference.    In addition, here is a list of due or overdue Health Maintenance reminders:  Wellness Visit with your Primary Provider - yearly due on 06/24/2011  Colon Cancer Screening - every 10 years. due on 01/19/2016  Cholesterol Lab - yearly due on 03/21/2017    Please call us at [phone] (or use Vis[employer]) to address the above recommendations if needed.           Thank you very much for trusting me and [facility].     Healthy regards,  José Miguel Gaviria MD

## 2018-05-04 DIAGNOSIS — E03.9 HYPOTHYROIDISM, UNSPECIFIED TYPE: ICD-10-CM

## 2018-05-04 RX ORDER — LIOTHYRONINE SODIUM 5 UG/1
TABLET ORAL
Qty: 180 TABLET | Refills: 5 | Status: SHIPPED | OUTPATIENT
Start: 2018-05-04 | End: 2018-07-17

## 2018-05-04 NOTE — TELEPHONE ENCOUNTER
Prescription approved per [facility] Refill Protocol.  Thyroid dose not changed per last lab results.    Nataly Vazquez, EDMOND, RN, N  [facility]) [phone]

## 2018-05-04 NOTE — TELEPHONE ENCOUNTER
"Requested Prescriptions   Pending Prescriptions Disp Refills     liothyronine (CYTOMEL) 5 MCG tablet [Pharmacy Med Name: LIOTHYRONINE SODIUM 5MCG TABS]  Last Written Prescription Date:  03/14/2018  Last Fill Quantity: 180 tablet,  # refills: 0   Last Office Visit: 12/5/2017   Future Office Visit:    Next 5 appointments (look out 90 days)     May 08, 2018  7:40 AM CDT   Carisa Nettles with Sonu Gaviria MD   [facility]    [address] [place] [id number] [phone]                  180 tablet 0     Sig: TAKE 4 TO 5 TABLETS BY MOUTH DAILY    Thyroid Protocol Failed    5/4/2018  7:51 AM       Failed - Normal TSH on file in past 12 months    Recent Labs   Lab Test  03/27/18   0924   TSH  0.01*             Passed - Patient is 12 years or older       Passed - Recent (12 mo) or future (30 days) visit within the authorizing provider's specialty    Patient had office visit in the last 12 months or has a visit in the next 30 days with authorizing provider or within the authorizing provider's specialty.  See \"Patient Info\" tab in inbasket, or \"Choose Columns\" in Meds & Orders section of the refill encounter.              "

## 2018-05-06 ENCOUNTER — MYC MEDICAL ADVICE (OUTPATIENT)
Dept: FAMILY MEDICINE | Facility: CLINIC | Age: 53
End: 2018-05-06

## 2018-05-07 NOTE — TELEPHONE ENCOUNTER
Mychart note sent to patient.    Nataly Vazquez, BS, RN, N  [facility]) [phone]

## 2018-06-15 DIAGNOSIS — R53.83 OTHER FATIGUE: ICD-10-CM

## 2018-06-15 DIAGNOSIS — F41.1 GENERALIZED ANXIETY DISORDER: ICD-10-CM

## 2018-06-15 DIAGNOSIS — E03.9 HYPOTHYROIDISM, UNSPECIFIED TYPE: ICD-10-CM

## 2018-06-15 LAB
CORTIS SERPL-MCNC: 7 UG/DL (ref 4–22)
T3FREE SERPL-MCNC: 2.9 PG/ML (ref 2.3–4.2)
T4 FREE SERPL-MCNC: 0.88 NG/DL (ref 0.76–1.46)
TSH SERPL DL<=0.005 MIU/L-ACNC: 0.05 MU/L (ref 0.4–4)

## 2018-06-15 PROCEDURE — 82088 ASSAY OF ALDOSTERONE: CPT | Mod: 90 | Performed by: FAMILY MEDICINE

## 2018-06-15 PROCEDURE — 82627 DEHYDROEPIANDROSTERONE: CPT | Performed by: FAMILY MEDICINE

## 2018-06-15 PROCEDURE — 99000 SPECIMEN HANDLING OFFICE-LAB: CPT | Performed by: FAMILY MEDICINE

## 2018-06-15 PROCEDURE — 36415 COLL VENOUS BLD VENIPUNCTURE: CPT | Performed by: FAMILY MEDICINE

## 2018-06-15 PROCEDURE — 82533 TOTAL CORTISOL: CPT | Performed by: FAMILY MEDICINE

## 2018-06-15 PROCEDURE — 84439 ASSAY OF FREE THYROXINE: CPT | Performed by: FAMILY MEDICINE

## 2018-06-15 PROCEDURE — 84481 FREE ASSAY (FT-3): CPT | Performed by: FAMILY MEDICINE

## 2018-06-15 PROCEDURE — 84443 ASSAY THYROID STIM HORMONE: CPT | Performed by: FAMILY MEDICINE

## 2018-06-16 LAB — ALDOST SERPL-MCNC: 18.9 NG/DL

## 2018-06-18 LAB — DHEA-S SERPL-MCNC: 225 UG/DL (ref 80–560)

## 2018-06-19 ENCOUNTER — MYC MEDICAL ADVICE (OUTPATIENT)
Dept: FAMILY MEDICINE | Facility: CLINIC | Age: 53
End: 2018-06-19

## 2018-06-19 DIAGNOSIS — R53.83 OTHER FATIGUE: ICD-10-CM

## 2018-06-19 RX ORDER — METHYLPREDNISOLONE 4 MG/1
4-8 TABLET ORAL DAILY
Qty: 180 TABLET | Refills: 0 | Status: SHIPPED | OUTPATIENT
Start: 2018-06-19 | End: 2018-07-17 | Stop reason: ALTCHOICE

## 2018-06-19 NOTE — TELEPHONE ENCOUNTER
Forwarded to RL.  Please review patient's Mychart message and advise.  Nataly Vazquez, RN, BS, PHN

## 2018-06-20 ENCOUNTER — TRANSFERRED RECORDS (OUTPATIENT)
Dept: HEALTH INFORMATION MANAGEMENT | Facility: CLINIC | Age: 53
End: 2018-06-20

## 2018-06-20 NOTE — PROGRESS NOTES
Dear Paco,    Here is a summary of your recent test results:  -All of your labs are normal except tsh is low but your t3 and t4 levels are ok.    For additional lab test information, labtestsonline.org is an excellent reference.    In addition, here is a list of due or overdue Health Maintenance reminders:  HIV SCREEN (SYSTEM ASSIGNED) due on 01/19/1984  Wellness Visit with your Primary Provider - yearly due on 06/24/2011  Colon Cancer Screening - every 10 years. due on 01/19/2016    Please call us at [phone] (or use Your Office Agent) to address the above recommendations if needed.           Thank you very much for trusting me and [facility].     Healthy regards,  José Miguel Gaviria MD

## 2018-07-17 ENCOUNTER — OFFICE VISIT (OUTPATIENT)
Dept: FAMILY MEDICINE | Facility: CLINIC | Age: 53
End: 2018-07-17
Payer: COMMERCIAL

## 2018-07-17 VITALS
HEART RATE: 88 BPM | OXYGEN SATURATION: 98 % | HEIGHT: 73 IN | DIASTOLIC BLOOD PRESSURE: 66 MMHG | WEIGHT: 219 LBS | SYSTOLIC BLOOD PRESSURE: 124 MMHG | BODY MASS INDEX: 29.03 KG/M2 | RESPIRATION RATE: 12 BRPM | TEMPERATURE: 97 F

## 2018-07-17 DIAGNOSIS — L70.9 ACNE, UNSPECIFIED ACNE TYPE: ICD-10-CM

## 2018-07-17 DIAGNOSIS — R13.10 DYSPHAGIA, UNSPECIFIED TYPE: ICD-10-CM

## 2018-07-17 DIAGNOSIS — Z11.4 SCREENING FOR HIV (HUMAN IMMUNODEFICIENCY VIRUS): ICD-10-CM

## 2018-07-17 DIAGNOSIS — M65.90 SYNOVITIS: ICD-10-CM

## 2018-07-17 DIAGNOSIS — E03.9 HYPOTHYROIDISM, UNSPECIFIED TYPE: Primary | ICD-10-CM

## 2018-07-17 DIAGNOSIS — Z12.11 SCREEN FOR COLON CANCER: ICD-10-CM

## 2018-07-17 DIAGNOSIS — Z13.220 SCREENING FOR LIPID DISORDERS: ICD-10-CM

## 2018-07-17 DIAGNOSIS — R53.83 FATIGUE, UNSPECIFIED TYPE: ICD-10-CM

## 2018-07-17 DIAGNOSIS — L71.9 ROSACEA: ICD-10-CM

## 2018-07-17 DIAGNOSIS — Z12.5 SCREENING FOR PROSTATE CANCER: ICD-10-CM

## 2018-07-17 DIAGNOSIS — R79.89 LOW SERUM CORTISOL LEVEL: ICD-10-CM

## 2018-07-17 LAB
ANION GAP SERPL CALCULATED.3IONS-SCNC: 8 MMOL/L (ref 3–14)
BUN SERPL-MCNC: 20 MG/DL (ref 7–30)
CALCIUM SERPL-MCNC: 8.6 MG/DL (ref 8.5–10.1)
CHLORIDE SERPL-SCNC: 108 MMOL/L (ref 94–109)
CHOLEST SERPL-MCNC: 218 MG/DL
CO2 SERPL-SCNC: 23 MMOL/L (ref 20–32)
CORTIS SERPL-MCNC: 17.3 UG/DL (ref 4–22)
CREAT SERPL-MCNC: 1.01 MG/DL (ref 0.66–1.25)
GFR SERPL CREATININE-BSD FRML MDRD: 77 ML/MIN/1.7M2
GLUCOSE SERPL-MCNC: 91 MG/DL (ref 70–99)
HDLC SERPL-MCNC: 44 MG/DL
LDLC SERPL CALC-MCNC: 109 MG/DL
NONHDLC SERPL-MCNC: 174 MG/DL
POTASSIUM SERPL-SCNC: 3.9 MMOL/L (ref 3.4–5.3)
PSA SERPL-ACNC: 0.89 UG/L (ref 0–4)
SODIUM SERPL-SCNC: 139 MMOL/L (ref 133–144)
T3FREE SERPL-MCNC: 2.8 PG/ML (ref 2.3–4.2)
T4 FREE SERPL-MCNC: 0.76 NG/DL (ref 0.76–1.46)
TRIGL SERPL-MCNC: 324 MG/DL
TSH SERPL DL<=0.005 MIU/L-ACNC: 0.12 MU/L (ref 0.4–4)

## 2018-07-17 PROCEDURE — 36415 COLL VENOUS BLD VENIPUNCTURE: CPT | Performed by: FAMILY MEDICINE

## 2018-07-17 PROCEDURE — 82533 TOTAL CORTISOL: CPT | Performed by: FAMILY MEDICINE

## 2018-07-17 PROCEDURE — 80048 BASIC METABOLIC PNL TOTAL CA: CPT | Performed by: FAMILY MEDICINE

## 2018-07-17 PROCEDURE — 99214 OFFICE O/P EST MOD 30 MIN: CPT | Performed by: FAMILY MEDICINE

## 2018-07-17 PROCEDURE — 80061 LIPID PANEL: CPT | Performed by: FAMILY MEDICINE

## 2018-07-17 PROCEDURE — 84439 ASSAY OF FREE THYROXINE: CPT | Performed by: FAMILY MEDICINE

## 2018-07-17 PROCEDURE — 84443 ASSAY THYROID STIM HORMONE: CPT | Performed by: FAMILY MEDICINE

## 2018-07-17 PROCEDURE — 82627 DEHYDROEPIANDROSTERONE: CPT | Performed by: FAMILY MEDICINE

## 2018-07-17 PROCEDURE — G0103 PSA SCREENING: HCPCS | Performed by: FAMILY MEDICINE

## 2018-07-17 PROCEDURE — 84481 FREE ASSAY (FT-3): CPT | Performed by: FAMILY MEDICINE

## 2018-07-17 RX ORDER — HYDROCORTISONE 5 MG/1
5 TABLET ORAL 4 TIMES DAILY
Qty: 360 TABLET | Refills: 3 | Status: SHIPPED | OUTPATIENT
Start: 2018-07-17 | End: 2018-08-09

## 2018-07-17 RX ORDER — LIOTHYRONINE SODIUM 5 UG/1
20 TABLET ORAL DAILY
Qty: 180 TABLET | Refills: 5 | Status: SHIPPED | OUTPATIENT
Start: 2018-07-17 | End: 2018-08-07

## 2018-07-17 RX ORDER — LEVOTHYROXINE SODIUM 137 UG/1
137 TABLET ORAL DAILY
Qty: 90 TABLET | Refills: 1 | Status: SHIPPED | OUTPATIENT
Start: 2018-07-17 | End: 2018-08-09

## 2018-07-17 RX ORDER — MINOCYCLINE HYDROCHLORIDE 100 MG/1
100 CAPSULE ORAL DAILY
Qty: 60 CAPSULE | Refills: 4 | Status: SHIPPED | OUTPATIENT
Start: 2018-07-17 | End: 2019-06-11

## 2018-07-17 ASSESSMENT — ANXIETY QUESTIONNAIRES
6. BECOMING EASILY ANNOYED OR IRRITABLE: NOT AT ALL
3. WORRYING TOO MUCH ABOUT DIFFERENT THINGS: NOT AT ALL
5. BEING SO RESTLESS THAT IT IS HARD TO SIT STILL: NOT AT ALL
1. FEELING NERVOUS, ANXIOUS, OR ON EDGE: SEVERAL DAYS
2. NOT BEING ABLE TO STOP OR CONTROL WORRYING: NOT AT ALL
GAD7 TOTAL SCORE: 1
7. FEELING AFRAID AS IF SOMETHING AWFUL MIGHT HAPPEN: NOT AT ALL
IF YOU CHECKED OFF ANY PROBLEMS ON THIS QUESTIONNAIRE, HOW DIFFICULT HAVE THESE PROBLEMS MADE IT FOR YOU TO DO YOUR WORK, TAKE CARE OF THINGS AT HOME, OR GET ALONG WITH OTHER PEOPLE: NOT DIFFICULT AT ALL

## 2018-07-17 ASSESSMENT — PATIENT HEALTH QUESTIONNAIRE - PHQ9: 5. POOR APPETITE OR OVEREATING: NOT AT ALL

## 2018-07-17 NOTE — PROGRESS NOTES
SUBJECTIVE:   CC: Paco Fatima is an 53 year old male who presents med check.     Healthy Habits:    Do you get at least three servings of calcium containing foods daily (dairy, green leafy vegetables, etc.)? yes    Amount of exercise or daily activities, outside of work: 2 day(s) per week    Problems taking medications regularly No    Medication side effects: No    Have you had an eye exam in the past two years? yes    Do you see a dentist twice per year? yes    Do you have sleep apnea, excessive snoring or daytime drowsiness?no       Anxiety Follow-Up    Status since last visit: Improved     Other associated symptoms:None    Complicating factors:   Significant life event: No   Current substance abuse: None  Depression symptoms: No      - Pt states he is doing well, only takes propanolol as needed for moderate anxiety [such as before work meetings].     DAY-7  DAY-7 SCORE 5/16/2013 7/29/2013 7/17/2018   Total Score 11 7 -   Total Score - - 1         Hypothyroidism Follow-up    Since last visit, patient describes the following symptoms: Weight stable, no hair loss, no skin changes, no constipation, no loose stools    - Currently on levothyroxine & cytomel for treatment, no concerns or complaints today.      Hypotestosteronism -- Pt takes a daily OTC DHEA supplement to manage his symptoms.     Synovitis -- Pt is currently taking Medrol for relief, was advised to do so by an orthopedic specialist who diagnosed him with this condition. He would like to switch from Medrol to hydrocortisone, as he found there are fewer side effects with the latter.   > Orthopedist did suggest seeing a rheumatologist, has not done so yet.     Dysphagia -- Pt has noticed difficulty swallowing recently, did schedule an endoscopy for further investigation. He also takes OTC omeprazole as needed for prevention of gastric reflux.         Today's PHQ-2 Score:   PHQ-2 ( 1999 Pfizer) 7/17/2018 11/21/2014   Q1: Little interest or pleasure in  "doing things 0 0   Q2: Feeling down, depressed or hopeless 0 0   PHQ-2 Score 0 0       Abuse: Current or Past(Physical, Sexual or Emotional)- No  Do you feel safe in your environment - Yes    Social History   Substance Use Topics     Smoking status: Never Smoker     Smokeless tobacco: Never Used     Alcohol use Yes      Comment: 3 beers weekly      If you drink alcohol do you typically have >3 drinks per day or >7 drinks per week? No                      Last PSA:   PSA   Date Value Ref Range Status   06/29/2011 0.69 0 - 4 ug/L Final       Reviewed orders with patient. Reviewed health maintenance and updated orders accordingly - Yes  Labs reviewed in EPIC    Reviewed and updated as needed this visit by clinical staff  Tobacco  Allergies  Meds  Problems  Med Hx  Surg Hx  Fam Hx  Soc Hx          Reviewed and updated as needed this visit by Provider  Allergies  Meds  Problems        Past Medical History:   Diagnosis Date     Essential hypertension with goal blood pressure less than 140/90 7/19/2016     h/o Subclinical hypothyroidism 2/12/2013     Other fatigue 3/21/2016        ROS:  Constitutional, HEENT, cardiovascular, pulmonary, GI, , musculoskeletal, neuro, skin, endocrine and psych systems are negative, except as otherwise noted.    This document serves as a record of the services and decisions personally performed and made by Sonu Gaviria MD. It was created on his behalf by Ryann Marie, a trained medical scribe. The creation of this document is based the provider's statements to the medical scribe.  Scribe Ryann Marie 8:04 AM, July 17, 2018    OBJECTIVE:   /66  Pulse 88  Temp 97  F (36.1  C) (Tympanic)  Resp 12  Ht 1.854 m (6' 1\")  Wt 99.3 kg (219 lb)  SpO2 98%  BMI 28.89 kg/m2  EXAM:  GENERAL: healthy, alert and no distress  EYES: Eyes grossly normal to inspection, PERRL and conjunctivae and sclerae normal  HENT: ear canals and TM's normal, nose and mouth without ulcers or " lesions  NECK: no adenopathy, no asymmetry, masses, or scars and thyroid normal to palpation  RESP: lungs clear to auscultation - no rales, rhonchi or wheezes  CV: regular rate and rhythm, normal S1 S2, no S3 or S4, no murmur, click or rub, no peripheral edema and peripheral pulses strong  ABDOMEN: soft, nontender, no hepatosplenomegaly, no masses and bowel sounds normal  MS: Mild effusion in left knee; otherwise no gross musculoskeletal defects noted, no edema  SKIN: no suspicious lesions or rashes  NEURO: Normal strength and tone, mentation intact and speech normal  PSYCH: mentation appears normal, affect normal/bright    Diagnostic Test Results:  None  ASSESSMENT/PLAN:   Paco was seen today for physical.    Diagnoses and all orders for this visit:    Hypothyroidism, unspecified type - Labs pending; Controlled, continue medication  -     levothyroxine (SYNTHROID/LEVOTHROID) 137 MCG tablet; Take 1 tablet (137 mcg) by mouth daily  -     liothyronine (CYTOMEL) 5 MCG tablet; Take 4 tablets (20 mcg) by mouth daily  -     TSH  -     T3, Free  -     T4, free    Fatigue, unspecified type/Low serum cortisol level (H) - Labs pending; Controlled, continue medication  -     hydrocortisone (CORTEF) 5 MG tablet; Take 1 tablet (5 mg) by mouth 4 times daily  -     DHEA sulfate  -     Cortisol  -     Basic metabolic panel  (Ca, Cl, CO2, Creat, Gluc, K, Na, BUN)    Perioral Dermatitis/Acne, unspecified acne type - Controlled, continue medication  -     minocycline (MINOCIN/DYNACIN) 100 MG capsule; Take 1 capsule (100 mg) by mouth daily    Dysphagia, unspecified type - Pt has upper endoscopy scheduled    Synovitis - Referral given to further investigate  -     RHEUMATOLOGY REFERRAL    Screening for lipid disorders  -     Lipid panel reflex to direct LDL Fasting    Screening for prostate cancer  -     PSA, screen          BP Readings from Last 1 Encounters:   07/17/18 124/66     Estimated body mass index is 28.89 kg/(m^2) as  "calculated from the following:    Height as of this encounter: 1.854 m (6' 1\").    Weight as of this encounter: 99.3 kg (219 lb).  Weight management plan: Discussed healthy diet and exercise guidelines and patient will follow up in 12 months in clinic to re-evaluate.   reports that he has never smoked. He has never used smokeless tobacco.      Counseling Resources:  ATP IV Guidelines  Pooled Cohorts Equation Calculator  FRAX Risk Assessment  ICSI Preventive Guidelines  Dietary Guidelines for Americans, 2010  USDA's MyPlate  ASA Prophylaxis  Lung CA Screening        The information in this document, created by the medical scribe for me, accurately reflects the services I personally performed and the decisions made by me. I have reviewed and approved this document for accuracy prior to leaving the patient care area.  8:04 AM, 07/17/18    Sonu Gaviria MD  [facility] [place]  "

## 2018-07-17 NOTE — MR AVS SNAPSHOT
After Visit Summary   7/17/2018    Paco Fatima    MRN: [id number]           Patient Information     Date Of Birth          1965        Visit Information        Provider Department      7/17/2018 7:40 AM Sonu Gaviria MD [facility] [place]        Today's Diagnoses     Screen for colon cancer    -  1    Screening for HIV (human immunodeficiency virus)        Hypothyroidism, unspecified type        Fatigue, unspecified type        Low serum cortisol level (H)        Perioral Dermatitis        Acne, unspecified acne type        Dysphagia, unspecified type        Screening for lipid disorders        Synovitis        Screening for prostate cancer          Care Instructions      Preventive Health Recommendations  Male Ages 50 - 64    Yearly exam:             See your health care provider every year in order to  o   Review health changes.   o   Discuss preventive care.    o   Review your medicines if your doctor has prescribed any.     Have a cholesterol test every 5 years, or more frequently if you are at risk for high cholesterol/heart disease.     Have a diabetes test (fasting glucose) every three years. If you are at risk for diabetes, you should have this test more often.     Have a colonoscopy at age 50, or have a yearly FIT test (stool test). These exams will check for colon cancer.      Talk with your health care provider about whether or not a prostate cancer screening test (PSA) is right for you.    You should be tested each year for STDs (sexually transmitted diseases), if you re at risk.     Shots: Get a flu shot each year. Get a tetanus shot every 10 years.     Nutrition:    Eat at least 5 servings of fruits and vegetables daily.     Eat whole-grain bread, whole-wheat pasta and brown rice instead of white grains and rice.     Get adequate Calcium and Vitamin D.     Lifestyle    Exercise for at least 150 minutes a week (30 minutes a day, 5 days a week). This will help you  control your weight and prevent disease.     Limit alcohol to one drink per day.     No smoking.     Wear sunscreen to prevent skin cancer.     See your dentist every six months for an exam and cleaning.     See your eye doctor every 1 to 2 years.            Follow-ups after your visit        Additional Services     RHEUMATOLOGY REFERRAL       Your provider has referred you to: [facility] [phone]   [email or web address]    Please be aware that coverage of these services is subject to the terms and limitations of your health insurance plan.  Call member services at your health plan with any benefit or coverage questions.      Please bring the following with you to your appointment:    (1) Any X-Rays, CTs or MRIs which have been performed.  Contact the facility where they were done to arrange for  prior to your scheduled appointment.  Any new CT, MRI or other procedures ordered by your specialist must be performed at a [facility] facility or coordinated by your clinic's referral office.    (2) List of current medications   (3) This referral request   (4) Any documents/labs given to you for this referral                  Follow-up notes from your care team     Return in about 1 year (around 7/17/2019) for Wellness Exam and fasting labs.      Your next 10 appointments already scheduled     Sep 17, 2018   Procedure with Leanna Mckeon MD   [facility] ([facility])    [address] [place] [id number] [phone]           [facility] is located at [address] [place]              Who to contact     If you have questions or need follow up information about today's clinic visit or your schedule please contact [facility] directly at [phone].  Normal or non-critical lab and imaging results will be communicated to you by MyChart, letter or phone within 4 business days after  "the clinic has received the results. If you do not hear from us within 7 days, please contact the clinic through [employer] or phone. If you have a critical or abnormal lab result, we will notify you by phone as soon as possible.  Submit refill requests through [employer] or call your pharmacy and they will forward the refill request to us. Please allow 3 business days for your refill to be completed.          Additional Information About Your Visit        [employer] Information     [employer] gives you secure access to your electronic health record. If you see a primary care provider, you can also send messages to your care team and make appointments. If you have questions, please call your primary care clinic.  If you do not have a primary care provider, please call [phone] and they will assist you.        Care EveryWhere ID     This is your Care EveryWhere ID. This could be used by other organizations to access your [place] medical records  [id number]        Your Vitals Were     Pulse Temperature Respirations Height Pulse Oximetry BMI (Body Mass Index)    88 97  F (36.1  C) (Tympanic) 12 6' 1\" (1.854 m) 98% 28.89 kg/m2       Blood Pressure from Last 3 Encounters:   07/17/18 124/66   12/05/17 120/80   08/01/17 120/80    Weight from Last 3 Encounters:   07/17/18 219 lb (99.3 kg)   12/05/17 235 lb (106.6 kg)   08/01/17 230 lb (104.3 kg)              We Performed the Following     Basic metabolic panel  (Ca, Cl, CO2, Creat, Gluc, K, Na, BUN)     Cortisol     DHEA sulfate     Lipid panel reflex to direct LDL Fasting     PSA, screen     RHEUMATOLOGY REFERRAL     T3, Free     T4, free     TSH          Today's Medication Changes          These changes are accurate as of 7/17/18  8:21 AM.  If you have any questions, ask your nurse or doctor.               Start taking these medicines.        Dose/Directions    hydrocortisone 5 MG tablet   Commonly known as:  CORTEF   Used for:  Fatigue, unspecified type   Started by:  Khadra, " Sonu MARIE MD        Dose:  5 mg   Take 1 tablet (5 mg) by mouth 4 times daily   Quantity:  360 tablet   Refills:  3         These medicines have changed or have updated prescriptions.        Dose/Directions    liothyronine 5 MCG tablet   Commonly known as:  CYTOMEL   This may have changed:  See the new instructions.   Used for:  Hypothyroidism, unspecified type   Changed by:  Sonu Gaviria MD        Dose:  20 mcg   Take 4 tablets (20 mcg) by mouth daily   Quantity:  180 tablet   Refills:  5         Stop taking these medicines if you haven't already. Please contact your care team if you have questions.     methylPREDNISolone 4 MG tablet   Commonly known as:  MEDROL   Stopped by:  Sonu Gaviria MD                Where to get your medicines      These medications were sent to [facility] [place] - [address] [address]     Phone:  [phone]     hydrocortisone 5 MG tablet    levothyroxine 137 MCG tablet    liothyronine 5 MCG tablet    minocycline 100 MG capsule                Primary Care Provider Office Phone # Fax #    Sonu Gaviria [phone]       [address] [place] [address]        Equal Access to Services     [facility]: Hadii aad ku hadasho Soomaali, waaxda luqadaha, qaybta kaalmada adeegyada, gary parks . So [facility] [phone].    ATENCIÓN: Si habla español, tiene a sanon disposición servicios gratuitos de asistencia lingüística. [facility] [phone].    We comply with applicable federal civil rights laws and [place] laws. We do not discriminate on the basis of race, color, national origin, age, disability, sex, sexual orientation, or gender identity.            Thank you!     Thank you for choosing [facility]  for your care. Our goal is always to provide you with excellent care. Hearing back from our patients is one way we can continue to  improve our services. Please take a few minutes to complete the written survey that you may receive in the mail after your visit with us. Thank you!             Your Updated Medication List - Protect others around you: Learn how to safely use, store and throw away your medicines at www.disposemymeds.org.          This list is accurate as of 7/17/18  8:21 AM.  Always use your most recent med list.                   Brand Name Dispense Instructions for use Diagnosis    hydrocortisone 5 MG tablet    CORTEF    360 tablet    Take 1 tablet (5 mg) by mouth 4 times daily    Fatigue, unspecified type       levothyroxine 137 MCG tablet    SYNTHROID/LEVOTHROID    90 tablet    Take 1 tablet (137 mcg) by mouth daily    Hypothyroidism, unspecified type       liothyronine 5 MCG tablet    CYTOMEL    180 tablet    Take 4 tablets (20 mcg) by mouth daily    Hypothyroidism, unspecified type       minocycline 100 MG capsule    MINOCIN/DYNACIN    60 capsule    Take 1 capsule (100 mg) by mouth daily    Rosacea, Acne, unspecified acne type       omeprazole 40 MG capsule    priLOSEC    90 capsule    Take 1 capsule (40 mg) by mouth daily Take 30-60 minutes before a meal.    Dysphagia, unspecified type       propranolol 40 MG tablet    INDERAL    90 tablet    TAKE ONE TABLET BY MOUTH EVERY DAY AS NEEDED    Generalized anxiety disorder       YL DHEA 25 MG Tabs   Generic drug:  Prasterone (DHEA)      Take 12.5 mg by mouth daily

## 2018-07-18 LAB — DHEA-S SERPL-MCNC: 61 UG/DL (ref 80–560)

## 2018-07-18 ASSESSMENT — PATIENT HEALTH QUESTIONNAIRE - PHQ9: SUM OF ALL RESPONSES TO PHQ QUESTIONS 1-9: 2

## 2018-07-18 ASSESSMENT — ANXIETY QUESTIONNAIRES: GAD7 TOTAL SCORE: 1

## 2018-08-01 ENCOUNTER — MYC MEDICAL ADVICE (OUTPATIENT)
Dept: FAMILY MEDICINE | Facility: CLINIC | Age: 53
End: 2018-08-01

## 2018-08-01 DIAGNOSIS — E03.9 HYPOTHYROIDISM, UNSPECIFIED TYPE: ICD-10-CM

## 2018-08-01 NOTE — TELEPHONE ENCOUNTER
Routing to PCP for further review/recommendations/orders.  Please review/advise on 07/17/2018 lab results  Please see Linqiat message below    Leigh Lee RN  [place] [facility]

## 2018-08-07 DIAGNOSIS — E03.9 HYPOTHYROIDISM, UNSPECIFIED TYPE: ICD-10-CM

## 2018-08-07 RX ORDER — LIOTHYRONINE SODIUM 5 UG/1
20 TABLET ORAL DAILY
Qty: 360 TABLET | Refills: 1 | Status: SHIPPED | OUTPATIENT
Start: 2018-08-07 | End: 2018-10-05

## 2018-08-07 NOTE — PROGRESS NOTES
pharmacy calling asking for the pt to have a 90 day supply for Cytomel     Refill is not within RN protocol     Requested Prescriptions   Pending Prescriptions Disp Refills     liothyronine (CYTOMEL) 5 MCG tablet 360 tablet 1     Sig: Take 4 tablets (20 mcg) by mouth daily    There is no refill protocol information for this order        LOV 7/17/2018    Huddled with Md Raven [facility] for refill for 6 months     Stacy Tipton RN, BSN  [facility]

## 2018-08-09 RX ORDER — LEVOTHYROXINE SODIUM 200 UG/1
200 TABLET ORAL DAILY
Qty: 90 TABLET | Refills: 1 | Status: SHIPPED | OUTPATIENT
Start: 2018-08-09 | End: 2018-10-05

## 2018-08-09 NOTE — PROGRESS NOTES
Deamarilia Antonio,    Here is a summary of your recent test results:  -Kidney function is normal (Cr, GFR), Sodium is normal, Potassium is normal, Calcium is normal, Glucose is normal (diabetes screening test).   -LDL(bad) cholesterol level is elevated, and your triglycerides are elevated which can increase your heart disease risk.  A diet high in fat and simple carbohydrates, genetics and being overweight can contribute to this. ADVISE: Exercise, a low fat, low carbohydrate diet, weight control, and omega-3 fatty acids (fish oil) 1726-3291 mg daily are helpful to improve this.  Rechecking your fasting cholesterol panel in 12 months is recommended (Lipid w/ LDL reflex, DX: hyperlipidemia)  -PSA (prostate specific antigen) test is normal.  This indicates a low likelihood of prostate cancer.  ADVISE: yearly recheck.   -TSH (thyroid stimulating hormone) is abnormal suggesting you are currently slight overreplaced.  ADVISE: adjusting your dose as you will be doing and recheck your TSH with a lab appointment in 6 weeks.     For additional lab test information, labtestsonline.org is an excellent reference.    In addition, here is a list of due or overdue Health Maintenance reminders:  Wellness Visit with your Primary Provider - yearly due on 06/24/2011  Colon Cancer Screening - every 10 years. due on 01/19/2016    Please call us at [phone] (or use [employer]) to address the above recommendations if needed.           Thank you very much for trusting me and [facility].     Healthy regards,  José Miguel Gaviria MD

## 2018-09-18 ENCOUNTER — HOSPITAL ENCOUNTER (OUTPATIENT)
Facility: CLINIC | Age: 53
Discharge: HOME | End: 2018-09-18
Attending: INTERNAL MEDICINE | Admitting: INTERNAL MEDICINE
Payer: COMMERCIAL

## 2018-09-18 VITALS
WEIGHT: 215 LBS | HEIGHT: 73 IN | SYSTOLIC BLOOD PRESSURE: 108 MMHG | OXYGEN SATURATION: 96 % | RESPIRATION RATE: 14 BRPM | DIASTOLIC BLOOD PRESSURE: 82 MMHG | BODY MASS INDEX: 28.49 KG/M2

## 2018-09-18 LAB
COLONOSCOPY: NORMAL
UPPER GI ENDOSCOPY: NORMAL

## 2018-09-18 PROCEDURE — G0121 COLON CA SCRN NOT HI RSK IND: HCPCS | Performed by: INTERNAL MEDICINE

## 2018-09-18 PROCEDURE — 25000125 ZZHC RX 250: Performed by: INTERNAL MEDICINE

## 2018-09-18 PROCEDURE — 99153 MOD SED SAME PHYS/QHP EA: CPT | Performed by: INTERNAL MEDICINE

## 2018-09-18 PROCEDURE — 45378 DIAGNOSTIC COLONOSCOPY: CPT | Performed by: INTERNAL MEDICINE

## 2018-09-18 PROCEDURE — 25000128 H RX IP 250 OP 636: Performed by: INTERNAL MEDICINE

## 2018-09-18 PROCEDURE — 43235 EGD DIAGNOSTIC BRUSH WASH: CPT | Performed by: INTERNAL MEDICINE

## 2018-09-18 PROCEDURE — G0500 MOD SEDAT ENDO SERVICE >5YRS: HCPCS | Performed by: INTERNAL MEDICINE

## 2018-09-18 RX ORDER — NALOXONE HYDROCHLORIDE 0.4 MG/ML
.1-.4 INJECTION, SOLUTION INTRAMUSCULAR; INTRAVENOUS; SUBCUTANEOUS
Status: DISCONTINUED | OUTPATIENT
Start: 2018-09-18 | End: 2018-09-18 | Stop reason: HOSPADM

## 2018-09-18 RX ORDER — ONDANSETRON 2 MG/ML
4 INJECTION INTRAMUSCULAR; INTRAVENOUS EVERY 6 HOURS PRN
Status: DISCONTINUED | OUTPATIENT
Start: 2018-09-18 | End: 2018-09-18 | Stop reason: HOSPADM

## 2018-09-18 RX ORDER — FENTANYL CITRATE 50 UG/ML
INJECTION, SOLUTION INTRAMUSCULAR; INTRAVENOUS PRN
Status: DISCONTINUED | OUTPATIENT
Start: 2018-09-18 | End: 2018-09-18 | Stop reason: HOSPADM

## 2018-09-18 RX ORDER — LIDOCAINE 40 MG/G
CREAM TOPICAL
Status: DISCONTINUED | OUTPATIENT
Start: 2018-09-18 | End: 2018-09-18 | Stop reason: HOSPADM

## 2018-09-18 RX ORDER — ONDANSETRON 4 MG/1
4 TABLET, ORALLY DISINTEGRATING ORAL EVERY 6 HOURS PRN
Status: DISCONTINUED | OUTPATIENT
Start: 2018-09-18 | End: 2018-09-18 | Stop reason: HOSPADM

## 2018-09-18 RX ORDER — ONDANSETRON 2 MG/ML
4 INJECTION INTRAMUSCULAR; INTRAVENOUS
Status: DISCONTINUED | OUTPATIENT
Start: 2018-09-18 | End: 2018-09-18 | Stop reason: HOSPADM

## 2018-09-18 RX ORDER — FLUMAZENIL 0.1 MG/ML
0.2 INJECTION, SOLUTION INTRAVENOUS
Status: DISCONTINUED | OUTPATIENT
Start: 2018-09-18 | End: 2018-09-18 | Stop reason: HOSPADM

## 2018-09-18 NOTE — LETTER
August 23, 2018      Paco Fatima  [address]  [facility] [address]         Thank you for choosing [facility] [place] [facility]. You are scheduled for the following services.     Date:  Tuesday September 18, 2018       Procedure: UPPER ENDOSCOPY & COLONOSCOPY  Doctor:  Dr Sharma          Arrival Time:   0730  *check in at Emergency/Endoscopy desk*  Procedure Time:   0800  Location:   [facility]        Endoscopy Department, First Floor (Enter through ER Doors) *         [address] [place], [place] [address]      [phone] or [phone] ([facility]) to reschedule      MIRALAX -GATORADE  PREP    Upper Endoscopy or Esophagogastroduodenoscopy (EGD) is a test performed to evaluate symptoms of persistent abdominal pain, nausea, vomiting or difficulty swallowing. It may also be used to treat various conditions of the upper gastrointestinal (GI) tract, such as bleeding, narrowing or abnormal growths. During the procedure, a doctor examines the lining of your esophagus, stomach and the first part of your small intestine through a thin, flexible tube called an endoscope. If growths or other abnormalities are found during the procedure, the doctor may remove the abnormal tissue (biopsy) for further examination.     Colonoscopy is the most accurate test to detect colon polyps and colon cancer; and the only test where polyps can be removed. During this procedure, a doctor examines the lining of your large intestine and rectum through a flexible tube.     Transportation  Arrange for a ride for the day of your procedures with a responsible adult.  A taxi ride is not an option unless you are accompanied by a responsible adult. If you fail to arrange transportation with a responsible adult, your procedure will be cancelled and rescheduled.    Purchase the  following supplies at your local pharmacy:  - 2 (two) bisacodyl tablets: each tablet contains 5 mg.  (Dulcolax  laxative  NOT Dulcolax  stool softener)   - 1 (one) 8.3 oz bottle of Polyethylene Glycol (PEG) 3350 Powder   (MiraLAX , Smooth LAX , ClearLAX  or equivalent)  - 64 oz Gatorade    Regular Gatorade, Gatorade G2 , Powerade , Powerade Zero  or Pedialyte  is acceptable. Red colored flavors are not allowed; all other colors (yellow, green, orange, purple and blue) are okay. It is also okay to buy two 2.12 oz packets of powdered Gatorade that can be mixed with water to a total volume of 64 oz of liquid.  - 1 (one) 10 oz bottle of Magnesium Citrate (Red colored flavors are not allowed)  It is also okay for you to use a 0.5 oz package of powdered magnesium citrate (17 g) mixed with 10 oz of water.      PREPARATION FOR COLONOSCOPY  7 days before:    Discontinue fiber supplements and medications containing iron. This includes Metamucil  and Fibercon ; and multivitamins with iron.  3 days before:    Begin a low-fiber diet. A low-fiber diet helps making the cleanout more effective.     Examples of a low-fiber diet include (but are not limited to): white bread, white rice, pasta, crackers, fish, chicken, eggs, ground beef, creamy peanut butter, cooked/steamed/boiled vegetables, canned fruit, bananas, melons, milk, plain yogurt cheese, salad dressing and other condiments.     The following are not allowed on a low-fiber diet: seeds, nuts, popcorn, bran, whole wheat, corn, quinoa, raw fruits and vegetables, berries and dried fruit, beans and lentils.    For additional details on low-fiber diet, please refer to the table on the last page.  2 days before:    Continue the low-fiber diet.     Drink at least 8 glasses of water throughout the day.     Stop eating solid foods at 11:45 pm.  1 day before:    In the morning: begin a clear liquid diet (liquids you can see through).     Examples of a clear liquid diet include: water, clear broth or bouillon, Gatorade, Pedialyte or Powerade, carbonated and non-carbonated soft drinks (Sprite , 7-Up ,  ginger ale), strained fruit juices without pulp (apple, white grape, white cranberry), Jell-O  and popsicles.     The following are not allowed on a clear liquid diet: red liquids, alcoholic beverages,  dairy products (milk, creamer, and yogurt), protein shakes, creamy broths, juice with pulp and chewing tobacco.    At noon: take 2 (two) bisacodyl tablets     At 4 (and no later than 6pm): start drinking the Miralax-Gatorade preparation (8.3 oz of Miralax mixed with 64 oz of Gatorade in a large pitcher). Drink 1(one) 8 oz glass every 15 minutes thereafter, until the mixture is gone.      COLON CLEANSING TIPS: drink adequate amounts of fluids before and after your colon cleansing to prevent dehydration. Stay near a toilet because you will have diarrhea. Even if you are sitting on the toilet, continue to drink the cleansing solution every 15 minutes. If you feel nauseous or vomit, rinse your mouth with water, take a 15 to 30-minute-break and then continue drinking the solution. You will be uncomfortable until the stool has flushed from your colon (in about 2 to 4 hours). You may feel chilled.    Day of your procedure  You may take all of your morning medications including blood pressure medications, blood thinners (if you have not been instructed to stop these by our office), methadone, anti-seizure medications with sips of water 3 hours prior to your procedure or earlier. Do not take insulin or vitamins prior to your procedure. Continue the clear liquid diet.   4 hours prior: drink 10 oz of magnesium citrate. It may be easier to drink it with a straw.    STOP consuming all liquids after that.     Do not take anything by mouth during this time.     Allow extra time to travel to your procedure as you may need to stop and use a restroom along the way.  You are ready for the procedure, if you followed all instructions and your stool is no longer formed, but clear or yellow liquid. If you are unsure whether your colon is  clean, please call our office at [phone] before you leave for your appointment.  Bring the following to your procedure:  - Insurance Card/Photo ID.   - List of current medications including over-the-counter medications and supplements.   - Your rescue inhaler if you currently use one to control asthma.    Canceling or rescheduling your appointment:   If you must cancel or reschedule your appointment, please call [phone] as soon as possible.    COLONOSCOPY PRE-PROCEDURE CHECKLIST  If you have diabetes, ask your regular doctor for diet and medication restrictions.  If you take an anticoagulant or anti-platelet medication (such as Coumadin , Lovenox , Pradaxa , Xarelto , Eliquis , etc.), please call your primary doctor for advice on holding this medication.  If you take aspirin you may continue to do so.  If you are or may be pregnant, please discuss the risks and benefits of this procedure with your doctor.    What happens during a colonoscopy?  Plan to spend up to two hours, starting at registration time, at the endoscopy center the day of your procedure. The colonoscopy takes an average of 15 to 30 minutes. Recovery time is about 30 minutes.     Before the exam:    You will change into a gown.    Your medical history and medication list will be reviewed with you, unless that has been done over the phone prior to the procedure.     A nurse will insert an intravenous (IV) line into your hand or arm.    The doctor will meet with you and will give you a consent form to sign.    During the exam:     Medicine will be given through the IV line to help you relax.     Your heart rate and oxygen levels will be monitored. If your blood pressure is low, you may be given fluids through the IV line.     The doctor will insert a flexible hollow tube, called a colonoscope, into your rectum. The scope will be advanced slowly through the large intestine (colon).    You may have a feeling of fullness or pressure.     If an  abnormal tissue or a polyp is found, the doctor may remove it through the endoscope for closer examination, or biopsy. Tissue removal is painless    After the exam:           Any tissue samples removed during the exam will be sent to a lab for evaluation. It may take 5-7 working days for you to be notified of the results.     A nurse will provide you with complete discharge instructions before you leave the endoscopy center. Be sure to ask the nurse for specific instructions if you take blood thinners such as Aspirin, Coumadin or Plavix.     The doctor will prepare a full report for you and for the physician who referred you for the procedure.     Your doctor will talk with you about the initial results of your exam.      Medication given during the exam will prohibit you from driving for the rest of the day.     Following the exam, you may resume your normal diet. Your first meal should be light, no greasy foods. Avoid alcohol until the next day.     You may resume your regular activities the day after the procedure.       LOW-FIBER DIET  Foods RECOMMENDED Foods to AVOID   Breads, Cereal, Rice and Pasta:   White bread, rolls, biscuits, croissant and marek toast.   Waffles, Slovenian toast and pancakes.   White rice, noodles, pasta, macaroni and peeled cooked potatoes.   Plain crackers and saltines.   Cooked cereals: farina, cream of rice.   Cold cereals: Puffed Rice , Rice Krispies , Corn Flakes  and Special K    Breads, Cereal, Rice and Pasta:   Breads or rolls with nuts, seeds or fruit.   Whole wheat, pumpernickel, rye breads and cornbread.   Potatoes with skin, brown or wild rice, and kasha (buckwheat).     Vegetables:   Tender cooked and canned vegetables without seeds: carrots, asparagus tips, green or wax beans, pumpkin, spinach, lima beans. Vegetables:   Raw or steamed vegetables (w/ or without seeds)   Oksana.   Winter squash, peas, broccoli, Brussel sprouts, cabbage, onions, cauliflower, baked beans, peas  and corn.   Fruits:   Strained fruit juice.   Canned fruit, except pineapple.   Ripe bananas and melon. Fruits:   Prunes and prune juice.   Raw fruits.   Dried fruits: figs, dates and raisins.   Milk/Dairy:   Milk: plain or flavored; yogurt; ice cream.   Custard; cheese and cottage cheese Milk/Dairy:     Meat and other proteins:   Ground, well-cooked tender beef, lamb, ham, veal, pork, fish, poultry, organ meats and eggs.   Peanut butter without nuts. Meat and other proteins:   Tough, fibrous meats with gristle.   Dry beans and peas; lentils and Tofu   Peanut butter with nuts.   Fats, Snack, Sweets, Condiments and Beverages:   Margarine, butter, oils, mayonnaise, sour cream and salad dressing, plain gravy.   Sugar, hard candy, clear jelly, honey and syrup.   Spices, cooked herbs, bouillon, broth and soups made with allowed vegetable, ketchup and mustard.   Coffee, tea and carbonated drinks.   Plain cakes, cookies and pretzels.   Gelatin, plain puddings, custard, ice cream, sherbet and popsicles. Fats, Snack, Sweets, Condiments and Beverages:   Nuts, seeds and coconut.   Jam, marmalade and preserves.   Pickles, olives, relish and horseradish.   All desserts containing nuts, seeds, dried fruit and coconut; or made from whole grains or bran.   Candy made with nuts or seeds.   Popcorn.             DIRECTIONS TO THE ENDOSCOPY DEPARTMENT  From the north ([facility])  Take 35W south, exit on [address] [address]. Get into the left hand papi, turn left (east), go one-half mile to [address] and turn left. Go north to the first stoplight, take a right on [address] and follow it to the Emergency entrance.    From the south ([facility])  Take 35N to the 35E split and exit on [address] [address]. On [address] [address], turn left (west) to [address]. Turn right (north) on [address]. Go north to the first stoplight, take a right on [address] and follow it to the Emergency  entrance.    From the east via 35E ([facility])  Take [address]E south to [address]  exit. Turn right on [address]. Go west to [address]. Turn right (north) on [address]. Go to the first stoplight, take a right and follow on [address] to the Emergency entrance.    From the east via [address] ([facility])  Take [address] to [address]. Turn left (south) on [address] to [address]. Turn left (east) on [address] and follow it to the Emergency entrance.    From the west via [address] ([place], [place])  Take [address] east to [address]. Turn right (south) on [address] to [address]. Turn left (east) on [address] and follow it to the Emergency entrance.

## 2018-09-18 NOTE — DISCHARGE INSTRUCTIONS
"  Tips to Control Acid Reflux  To control acid reflux, you ll need to make some basic diet and lifestyle changes. The simple steps outlined below may be all you ll need to relieve discomfort.   Watch What You Eat      Avoid fatty foods and spicy foods.    Eat fewer acidic foods, such as citrus and tomato-based foods. These can increase symptoms.     Limit drinking alcohol, caffeine, and fizzy beverages. All increase acid reflux.    Try limiting chocolate, peppermint, and spearmint. These can worsen acid reflux in some people.    Watch When You Eat    Avoid lying down for 3 hours after eating.    Do not snack before going to bed.    Raise Your Head  Raising your head and upper body by 4\" to 6\" helps limit reflux when you re lying down. Put blocks under the head of the bed frame to raise it.                    [id number] Formerly [facility], [address] [place] [address], [place] [address]. All rights reserved. This information is not intended as a substitute for professional medical care. Always follow your healthcare professional's instructions.  "

## 2018-09-18 NOTE — IP AVS SNAPSHOT
[facility] End[facility]    [address] [facility] [address]    Phone:  [phone]    Fax:  [phone]                                       After Visit Summary   9/18/2018    Paco Fatima    MRN: [id number]           After Visit Summary Signature Page     I have received my discharge instructions, and my questions have been answered. I have discussed any challenges I see with this plan with the nurse or doctor.    ..........................................................................................................................................  Patient/Patient Representative Signature      ..........................................................................................................................................  Patient Representative Print Name and Relationship to Patient    ..................................................               ................................................  Date                                   Time    ..........................................................................................................................................  Reviewed by Signature/Title    ...................................................              ..............................................  Date                                               Time          22EPIC Rev 08/18

## 2018-09-18 NOTE — IP AVS SNAPSHOT
MRN:[id number]                      After Visit Summary   9/18/2018    Paco Fatima    MRN: [id number]           Thank you!     Thank you for choosing [facility] for your care. Our goal is always to provide you with excellent care. Hearing back from our patients is one way we can continue to improve our services. Please take a few minutes to complete the written survey that you may receive in the mail after you visit. If you would like to speak to someone directly about your visit please contact Patient Relations at [phone]. Thank you!          Patient Information     Date Of Birth          1965        About your hospital stay     You were admitted on:  September 18, 2018 You last received care in the:  [facility]    You were discharged on:  September 18, 2018       Who to Call     For medical emergencies, please call 911.  For non-urgent questions about your medical care, please call your primary care provider or clinic, [phone]  For questions related to your surgery, please call your surgery clinic        Attending Provider     Provider Specialty    Noé Sharma MD Gastroenterology       Primary Care Provider Office Phone # Fax #    Sonu Gaviria [phone]      Your next 10 appointments already scheduled     Sep 25, 2018  7:40 AM CDT   [facility] Long with [facility]ia [facility]   [facility]    [address] [place] [address] [id number] [phone]            Oct 26, 2018 11:00 AM CDT   New Visit with Raheem Knowles MD   [facility]    [address] [place] [address] [phone] [phone]              Further instructions from your care team         Tips to Control Acid Reflux  To control acid reflux, you ll need to make some basic diet and lifestyle changes. The simple steps outlined below may be all you ll  "need to relieve discomfort.   Watch What You Eat      Avoid fatty foods and spicy foods.    Eat fewer acidic foods, such as citrus and tomato-based foods. These can increase symptoms.     Limit drinking alcohol, caffeine, and fizzy beverages. All increase acid reflux.    Try limiting chocolate, peppermint, and spearmint. These can worsen acid reflux in some people.    Watch When You Eat    Avoid lying down for 3 hours after eating.    Do not snack before going to bed.    Raise Your Head  Raising your head and upper body by 4\" to 6\" helps limit reflux when you re lying down. Put blocks under the head of the bed frame to raise it.                    [id number] [facility], [address] [place] [address], [place] [address]. All rights reserved. This information is not intended as a substitute for professional medical care. Always follow your healthcare professional's instructions.    Pending Results     No orders found from 9/16/2018 to 9/19/2018.            Admission Information     Date & Time Provider Department Dept. Phone    9/18/2018 Noé Sharma MD [facility] Endoscopy [phone]      Your Vitals Were     Blood Pressure Respirations Height Weight Pulse Oximetry BMI (Body Mass Index)    121/82 15 1.854 m (6' 1\") 97.5 kg (215 lb) 97% 28.37 kg/m2      MyChart Information     [employer] gives you secure access to your electronic health record. If you see a primary care provider, you can also send messages to your care team and make appointments. If you have questions, please call your primary care clinic.  If you do not have a primary care provider, please call [phone] and they will assist you.        Care EveryWhere ID     This is your Care EveryWhere ID. This could be used by other organizations to access your [place] medical records  [id number]        Equal Access to Services     ADRIENNE SALMON AH: Meryl Fletcher, valerie rojo, pritesh naylor, gary godoy " lafarnaz Zuniga [facility] [phone].    ATENCIÓN: Si flora hutchison, tiene a sanon disposición servicios gratuitos de asistencia lingüística. Skyler gmoez [phone].    We comply with applicable federal civil rights laws and [place] laws. We do not discriminate on the basis of race, color, national origin, age, disability, sex, sexual orientation, or gender identity.               Review of your medicines      CONTINUE these medicines which have NOT CHANGED        Dose / Directions    levothyroxine 200 MCG tablet   Commonly known as:  SYNTHROID/LEVOTHROID   Used for:  Hypothyroidism, unspecified type        Dose:  200 mcg   Take 1 tablet (200 mcg) by mouth daily   Quantity:  90 tablet   Refills:  1       liothyronine 5 MCG tablet   Commonly known as:  CYTOMEL   Used for:  Hypothyroidism, unspecified type        Dose:  20 mcg   Take 4 tablets (20 mcg) by mouth daily   Quantity:  360 tablet   Refills:  1       minocycline 100 MG capsule   Commonly known as:  MINOCIN/DYNACIN   Used for:  Rosacea, Acne, unspecified acne type        Dose:  100 mg   Take 1 capsule (100 mg) by mouth daily   Quantity:  60 capsule   Refills:  4       omeprazole 40 MG capsule   Commonly known as:  priLOSEC   Used for:  Dysphagia, unspecified type        Dose:  40 mg   Take 1 capsule (40 mg) by mouth daily Take 30-60 minutes before a meal.   Quantity:  90 capsule   Refills:  3       propranolol 40 MG tablet   Commonly known as:  INDERAL   Used for:  Generalized anxiety disorder        TAKE ONE TABLET BY MOUTH EVERY DAY AS NEEDED   Quantity:  90 tablet   Refills:  1       YL DHEA 25 MG Tabs   Generic drug:  Prasterone (DHEA)        Dose:  12.5 mg   Take 12.5 mg by mouth daily   Refills:  0                Protect others around you: Learn how to safely use, store and throw away your medicines at www.disposemymeds.org.             Medication List: This is a list of all your medications and when to take them. Check marks below indicate your daily home  schedule. Keep this list as a reference.      Medications           Morning Afternoon Evening Bedtime As Needed    levothyroxine 200 MCG tablet   Commonly known as:  SYNTHROID/LEVOTHROID   Take 1 tablet (200 mcg) by mouth daily                                liothyronine 5 MCG tablet   Commonly known as:  CYTOMEL   Take 4 tablets (20 mcg) by mouth daily                                minocycline 100 MG capsule   Commonly known as:  MINOCIN/DYNACIN   Take 1 capsule (100 mg) by mouth daily                                omeprazole 40 MG capsule   Commonly known as:  priLOSEC   Take 1 capsule (40 mg) by mouth daily Take 30-60 minutes before a meal.                                propranolol 40 MG tablet   Commonly known as:  INDERAL   TAKE ONE TABLET BY MOUTH EVERY DAY AS NEEDED                                YL DHEA 25 MG Tabs   Take 12.5 mg by mouth daily   Generic drug:  Prasterone (DHEA)

## 2018-09-18 NOTE — H&P
Pre-Endoscopy History and Physical     Paco Fatima MRN# [id number]   YOB: 1965 Age: 53 year old     Date of Procedure: 9/18/2018  Primary care provider: Sonu Gaviria  Type of Endoscopy: Colonoscopy with possible biopsy, possible polypectomy and Gastroscopy with possible biopsy, possible dilation  Reason for Procedure: screen,reflux  Type of Anesthesia Anticipated: Conscious Sedation    HPI:    Paco is a 53 year old male who will be undergoing the above procedure.      A history and physical has been performed. The patient's medications and allergies have been reviewed. The risks and benefits of the procedure and the sedation options and risks were discussed with the patient.  All questions were answered and informed consent was obtained.      He denies a personal or family history of anesthesia complications or bleeding disorders.     Patient Active Problem List   Diagnosis     Recurrent Folliculitis     Perioral Dermatitis     Generalized anxiety disorder     Family history of diabetes mellitus     Fasciculations of muscle     Hypotestosteronism     Hyperlipidemia LDL goal <160     Adult ADHD     Other fatigue     Hypothyroidism, unspecified type     Essential hypertension with goal blood pressure less than 140/90        Past Medical History:   Diagnosis Date     Essential hypertension with goal blood pressure less than 140/90 7/19/2016     h/o Subclinical hypothyroidism 2/12/2013     Other fatigue 3/21/2016        Past Surgical History:   Procedure Laterality Date     HC REMOVAL OF TONSILS,<13 Y/O      Tonsillectomy, under 13 yrs     HC REPAIR RECURR INGUIN DMITRY,REDUCIBL  1987    Hernia, inguinal     SURGICAL HISTORY OF -   1993    deviated septum repair       Social History   Substance Use Topics     Smoking status: Never Smoker     Smokeless tobacco: Never Used     Alcohol use Yes      Comment: 3 beers weekly       Family History   Problem Relation Age of Onset     C.A.D. Maternal  "Grandmother      GEORGIEZacSAGARSIDDHARTHA. Maternal Grandfather      Cancer Father       age 30-Sarcoma     Other Cancer Father      Psychotic Disorder Mother      anxiety     Diabetes Mother      DM-1     Thyroid Disease Mother      Musculoskeletal Disorder Sister 38     MS     Thyroid Disease Sister      Diabetes Sister      DM-1     Lipids No family hx of      Hypertension No family hx of      Prostate Cancer No family hx of      Colon Cancer No family hx of        Prior to Admission medications    Medication Sig Start Date End Date Taking? Authorizing Provider   levothyroxine (SYNTHROID/LEVOTHROID) 200 MCG tablet Take 1 tablet (200 mcg) by mouth daily 18  Yes Sonu Gaviria MD   liothyronine (CYTOMEL) 5 MCG tablet Take 4 tablets (20 mcg) by mouth daily 18  Yes Sonu Gaviria MD   Prasterone, DHEA, (YL DHEA) 25 MG TABS Take 12.5 mg by mouth daily 18  Yes Sonu Gaviria MD   minocycline (MINOCIN/DYNACIN) 100 MG capsule Take 1 capsule (100 mg) by mouth daily 18   Sonu Gaviria MD   omeprazole (PRILOSEC) 40 MG capsule Take 1 capsule (40 mg) by mouth daily Take 30-60 minutes before a meal. 16   Sonu Gaviria MD   propranolol (INDERAL) 40 MG tablet TAKE ONE TABLET BY MOUTH EVERY DAY AS NEEDED 16   Sonu Gaviria MD       No Known Allergies     REVIEW OF SYSTEMS:   5 point ROS negative except as noted above in HPI, including Gen., Resp., CV, GI &  system review.    PHYSICAL EXAM:   /90  Resp 12  Ht 1.854 m (6' 1\")  Wt 97.5 kg (215 lb)  SpO2 98%  BMI 28.37 kg/m2 Estimated body mass index is 28.37 kg/(m^2) as calculated from the following:    Height as of this encounter: 1.854 m (6' 1\").    Weight as of this encounter: 97.5 kg (215 lb).   GENERAL APPEARANCE: alert, and oriented  MENTAL STATUS: alert  AIRWAY EXAM: Mallampatti Class I (visualization of the soft palate, fauces, uvula, anterior and posterior pillars)  RESP: lungs clear to auscultation - no rales, rhonchi or " wheezes  CV: regular rates and rhythm  DIAGNOSTICS:    Not indicated    IMPRESSION   ASA Class 2 - Mild systemic disease    PLAN:   Plan for Colonoscopy with possible biopsy, possible polypectomy and Gastroscopy with possible biopsy, possible dilation. We discussed the risks, benefits and alternatives and the patient wished to proceed.    The above has been forwarded to the consulting provider.      Signed Electronically by: Noé Sharma  September 18, 2018

## 2018-10-02 DIAGNOSIS — R53.83 OTHER FATIGUE: ICD-10-CM

## 2018-10-02 DIAGNOSIS — E03.9 HYPOTHYROIDISM, UNSPECIFIED TYPE: ICD-10-CM

## 2018-10-02 DIAGNOSIS — F41.1 GENERALIZED ANXIETY DISORDER: ICD-10-CM

## 2018-10-02 LAB
CORTIS SERPL-MCNC: 7.6 UG/DL (ref 4–22)
T3FREE SERPL-MCNC: 3 PG/ML (ref 2.3–4.2)

## 2018-10-02 PROCEDURE — 84481 FREE ASSAY (FT-3): CPT | Performed by: FAMILY MEDICINE

## 2018-10-02 PROCEDURE — 84443 ASSAY THYROID STIM HORMONE: CPT | Performed by: FAMILY MEDICINE

## 2018-10-02 PROCEDURE — 82533 TOTAL CORTISOL: CPT | Performed by: FAMILY MEDICINE

## 2018-10-02 PROCEDURE — 84439 ASSAY OF FREE THYROXINE: CPT | Performed by: FAMILY MEDICINE

## 2018-10-02 PROCEDURE — 82627 DEHYDROEPIANDROSTERONE: CPT | Performed by: FAMILY MEDICINE

## 2018-10-02 PROCEDURE — 36415 COLL VENOUS BLD VENIPUNCTURE: CPT | Performed by: FAMILY MEDICINE

## 2018-10-03 LAB
DHEA-S SERPL-MCNC: 179 UG/DL (ref 80–560)
T4 FREE SERPL-MCNC: 0.62 NG/DL (ref 0.76–1.46)
TSH SERPL DL<=0.005 MIU/L-ACNC: 0.11 MU/L (ref 0.4–4)

## 2018-10-04 NOTE — PROGRESS NOTES
"  SUBJECTIVE:                                                      Paco Fatima is a 53 year old male who presents to clinic today for the following health issues:    Anxiety Follow-Up    Status since last visit: No change    Other associated symptoms:None    Complicating factors:   Significant life event: No   Current substance abuse: None  Depression symptoms: No    DAY-7  DAY-7 SCORE 5/16/2013 7/29/2013 7/17/2018   Total Score 11 7 -   Total Score - - 1     - Pt is doing well on propanolol, states anxiety is stable at this time.        Hypothyroidism Follow-up    Since last visit, patient describes the following symptoms: flushing     - Due to noticed symptoms, he changed from levothyroxine QD to only the liothyronine TID. He found this to help alleviate symptoms, did report 3 days ago for a thyroid labs check.   > Has appointment with endocrinologist to help manage this condition and discuss his cortisol concerns.       TSH   Date Value Ref Range Status   10/02/2018 0.11 (L) 0.40 - 4.00 mU/L Final     T4 Total   Date Value Ref Range Status   06/08/2000 6.9 4.5 - 12.5 MCG/DL Final     T4 Free   Date Value Ref Range Status   10/02/2018 0.62 (L) 0.76 - 1.46 ng/dL Final           Problem list and histories reviewed & adjusted, as indicated.  Additional history: as documented    ROS:  Constitutional, HEENT, cardiovascular, pulmonary, GI, , musculoskeletal, neuro, skin, endocrine and psych systems are negative, except as otherwise noted.      This document serves as a record of the services and decisions personally performed and made by Sonu Gaviria MD. It was created on his behalf by Ryann Marie, a trained medical scribe. The creation of this document is based the provider's statements to the medical scribe.  Scribe Ryann Marie 9:52 AM, October 5, 2018    OBJECTIVE:                                                      /84  Pulse 96  Temp 98.5  F (36.9  C) (Oral)  Ht 1.854 m (6' 1\")  Wt 99.8 kg (220 " lb)  SpO2 96%  BMI 29.03 kg/m2 Body mass index is 29.03 kg/(m^2).   GENERAL: healthy, alert, well nourished, well hydrated, no distress  HENT: ear canals- normal; TMs- normal; Nose- normal; Mouth- no ulcers, no lesions  NECK: no tenderness, no adenopathy, no asymmetry, no masses, no stiffness; thyroid- normal to palpation  RESP: lungs clear to auscultation - no rales, no rhonchi, no wheezes  CV: regular rates and rhythm, normal S1 S2, no S3 or S4 and no murmur, no click or rub -  ABDOMEN: soft, no tenderness, no  hepatosplenomegaly, no masses, normal bowel sounds  MS: extremities- no gross deformities noted, no edema  SKIN: Few, pink 5 mm papules around mouth & less so on forehead; Minimal flushing noted; otherwise no suspicious lesions, no rashes  PSYCH: Alert and oriented times 3; speech- coherent , normal rate and volume; able to articulate logical thoughts, able to abstract reason, no tangential thoughts, no hallucinations or delusions, affect- normal    Diagnostic test results:  None  ASSESSMENT/PLAN:                                                      Paco was seen today for recheck medication.    Diagnoses and all orders for this visit:    Hypothyroidism, unspecified type - he felt the levothyroxine caused flushing aPt will begin new medication regimen & monitor symptoms; Will also meet with endocrinology to help manage this condition  -     liothyronine (CYTOMEL) 5 MCG tablet; Take 3 tablets (15 mcg) by mouth 3 times daily - TID dosing preferred by the patient.     Rash - Managed with minocycline as needed    Flushing - Labs pending - better with the switch to T3 only replacement  -     Tryptase  -     CBC with platelets and differential    Encounter for immunization - Administered today in clinic  -      FLU VAC PRESRV FREE QUAD SPLIT VIR 3+YRS IM  -     ADMIN 1st VACCINE        Risks, benefits and alternatives of treatments discussed. Plan agreed on.      Followup: Return in about 6 months (around  "4/5/2019) for Med Check.    See patient instructions.       BMI:   Estimated body mass index is 29.03 kg/(m^2) as calculated from the following:    Height as of this encounter: 1.854 m (6' 1\").    Weight as of this encounter: 99.8 kg (220 lb).   Weight management plan: Discussed healthy diet and exercise guidelines and patient will follow up in 12 months in clinic to re-evaluate.        The information in this document, created by the medical scribe for me, accurately reflects the services I personally performed and the decisions made by me. I have reviewed and approved this document for accuracy prior to leaving the patient care area.  9:52 AM, 10/05/18        José Miguel Gaviria MD   Pager: [phone]    "

## 2018-10-05 ENCOUNTER — OFFICE VISIT (OUTPATIENT)
Dept: FAMILY MEDICINE | Facility: CLINIC | Age: 53
End: 2018-10-05
Payer: COMMERCIAL

## 2018-10-05 VITALS
OXYGEN SATURATION: 96 % | SYSTOLIC BLOOD PRESSURE: 124 MMHG | DIASTOLIC BLOOD PRESSURE: 84 MMHG | TEMPERATURE: 98.5 F | HEIGHT: 73 IN | HEART RATE: 96 BPM | WEIGHT: 220 LBS | BODY MASS INDEX: 29.16 KG/M2

## 2018-10-05 DIAGNOSIS — R21 RASH: ICD-10-CM

## 2018-10-05 DIAGNOSIS — R23.2 FLUSHING: ICD-10-CM

## 2018-10-05 DIAGNOSIS — Z23 ENCOUNTER FOR IMMUNIZATION: ICD-10-CM

## 2018-10-05 DIAGNOSIS — E03.9 HYPOTHYROIDISM, UNSPECIFIED TYPE: Primary | ICD-10-CM

## 2018-10-05 LAB
BASOPHILS # BLD AUTO: 0 10E9/L (ref 0–0.2)
BASOPHILS NFR BLD AUTO: 0.3 %
DIFFERENTIAL METHOD BLD: NORMAL
EOSINOPHIL # BLD AUTO: 0.2 10E9/L (ref 0–0.7)
EOSINOPHIL NFR BLD AUTO: 2.3 %
ERYTHROCYTE [DISTWIDTH] IN BLOOD BY AUTOMATED COUNT: 13.4 % (ref 10–15)
HCT VFR BLD AUTO: 47.8 % (ref 40–53)
HGB BLD-MCNC: 16.4 G/DL (ref 13.3–17.7)
LYMPHOCYTES # BLD AUTO: 2.5 10E9/L (ref 0.8–5.3)
LYMPHOCYTES NFR BLD AUTO: 37.2 %
MCH RBC QN AUTO: 28.8 PG (ref 26.5–33)
MCHC RBC AUTO-ENTMCNC: 34.3 G/DL (ref 31.5–36.5)
MCV RBC AUTO: 84 FL (ref 78–100)
MONOCYTES # BLD AUTO: 0.8 10E9/L (ref 0–1.3)
MONOCYTES NFR BLD AUTO: 11.4 %
NEUTROPHILS # BLD AUTO: 3.3 10E9/L (ref 1.6–8.3)
NEUTROPHILS NFR BLD AUTO: 48.8 %
PLATELET # BLD AUTO: 286 10E9/L (ref 150–450)
RBC # BLD AUTO: 5.69 10E12/L (ref 4.4–5.9)
WBC # BLD AUTO: 6.7 10E9/L (ref 4–11)

## 2018-10-05 PROCEDURE — 90686 IIV4 VACC NO PRSV 0.5 ML IM: CPT | Performed by: FAMILY MEDICINE

## 2018-10-05 PROCEDURE — 36415 COLL VENOUS BLD VENIPUNCTURE: CPT | Performed by: FAMILY MEDICINE

## 2018-10-05 PROCEDURE — 83520 IMMUNOASSAY QUANT NOS NONAB: CPT | Performed by: FAMILY MEDICINE

## 2018-10-05 PROCEDURE — 99214 OFFICE O/P EST MOD 30 MIN: CPT | Mod: 25 | Performed by: FAMILY MEDICINE

## 2018-10-05 PROCEDURE — 90471 IMMUNIZATION ADMIN: CPT | Performed by: FAMILY MEDICINE

## 2018-10-05 PROCEDURE — 85025 COMPLETE CBC W/AUTO DIFF WBC: CPT | Performed by: FAMILY MEDICINE

## 2018-10-05 RX ORDER — LIOTHYRONINE SODIUM 5 UG/1
15 TABLET ORAL 3 TIMES DAILY
Qty: 270 TABLET | Refills: 11 | Status: SHIPPED | OUTPATIENT
Start: 2018-10-05 | End: 2019-06-11

## 2018-10-05 NOTE — MR AVS SNAPSHOT
After Visit Summary   10/5/2018    Paco Fatima    MRN: [id number]           Patient Information     Date Of Birth          1965        Visit Information        Provider Department      10/5/2018 9:20 AM oSnu Gaviria MD [facility]        Today's Diagnoses     Hypothyroidism, unspecified type    -  1    Rash        Flushing        Encounter for immunization           Follow-ups after your visit        Follow-up notes from your care team     Return in about 6 months (around 4/5/2019) for Med Check.      Your next 10 appointments already scheduled     Oct 15, 2018  9:20 AM CDT   MyChart Long with Sonu Gaviria MD   [facility] ([facility])    [address] [place] [id number] [phone]            Oct 26, 2018 11:00 AM CDT   New Visit with Raheem Knowles MD   [facility]    [address] [place] [id number] [phone]              Who to contact     If you have questions or need follow up information about today's clinic visit or your schedule please contact [facility] directly at [phone].  Normal or non-critical lab and imaging results will be communicated to you by MyChart, letter or phone within 4 business days after the clinic has received the results. If you do not hear from us within 7 days, please contact the clinic through MyChart or phone. If you have a critical or abnormal lab result, we will notify you by phone as soon as possible.  Submit refill requests through [employer] or call your pharmacy and they will forward the refill request to us. Please allow 3 business days for your refill to be completed.          Additional Information About Your Visit        MyChart Information     [employer] gives you secure access to your electronic health record. If you see a primary care provider, you can also send messages to your care  "team and make appointments. If you have questions, please call your primary care clinic.  If you do not have a primary care provider, please call [phone] and they will assist you.        Care EveryWhere ID     This is your Care EveryWhere ID. This could be used by other organizations to access your [place] medical records  [id number]        Your Vitals Were     Pulse Temperature Height Pulse Oximetry BMI (Body Mass Index)       96 98.5  F (36.9  C) (Oral) 6' 1\" (1.854 m) 96% 29.03 kg/m2        Blood Pressure from Last 3 Encounters:   10/05/18 124/84   09/18/18 108/82   07/17/18 124/66    Weight from Last 3 Encounters:   10/05/18 220 lb (99.8 kg)   09/18/18 215 lb (97.5 kg)   07/17/18 219 lb (99.3 kg)              We Performed the Following     ADMIN 1st VACCINE     CBC with platelets and differential     HC FLU VAC PRESRV FREE QUAD SPLIT VIR 3+YRS IM     Tryptase          Today's Medication Changes          These changes are accurate as of 10/5/18 10:05 AM.  If you have any questions, ask your nurse or doctor.               These medicines have changed or have updated prescriptions.        Dose/Directions    liothyronine 5 MCG tablet   Commonly known as:  CYTOMEL   This may have changed:    - how much to take  - when to take this   Used for:  Hypothyroidism, unspecified type   Changed by:  Sonu Gaviria MD        Dose:  15 mcg   Take 3 tablets (15 mcg) by mouth 3 times daily   Quantity:  270 tablet   Refills:  11            Where to get your medicines      These medications were sent to [facility] [place] [id number]     Phone:  [phone]     liothyronine 5 MCG tablet                Primary Care Provider Office Phone # Fax #    Sonu Gaviria [phone]       [address] [place] [id number]        Equal Access to Services     ADRIENNE SALMON AH: valerie Bustamante " pritesh rojojeff floresitagayr min. So [facility] [phone].    ATENCIÓN: Si flora hutchison, tiene a sanon disposición servicios gratuitos de asistencia lingüística. Skyler al [phone].    We comply with applicable federal civil rights laws and [place] laws. We do not discriminate on the basis of race, color, national origin, age, disability, sex, sexual orientation, or gender identity.            Thank you!     Thank you for choosing [facility]  for your care. Our goal is always to provide you with excellent care. Hearing back from our patients is one way we can continue to improve our services. Please take a few minutes to complete the written survey that you may receive in the mail after your visit with us. Thank you!             Your Updated Medication List - Protect others around you: Learn how to safely use, store and throw away your medicines at www.disposemymeds.org.          This list is accurate as of 10/5/18 10:05 AM.  Always use your most recent med list.                   Brand Name Dispense Instructions for use Diagnosis    liothyronine 5 MCG tablet    CYTOMEL    270 tablet    Take 3 tablets (15 mcg) by mouth 3 times daily    Hypothyroidism, unspecified type       minocycline 100 MG capsule    MINOCIN/DYNACIN    60 capsule    Take 1 capsule (100 mg) by mouth daily    Rosacea, Acne, unspecified acne type       omeprazole 40 MG capsule    priLOSEC    90 capsule    Take 1 capsule (40 mg) by mouth daily Take 30-60 minutes before a meal.    Dysphagia, unspecified type       propranolol 40 MG tablet    INDERAL    90 tablet    TAKE ONE TABLET BY MOUTH EVERY DAY AS NEEDED    Generalized anxiety disorder       YL DHEA 25 MG Tabs   Generic drug:  Prasterone (DHEA)      Take 12.5 mg by mouth daily

## 2018-10-08 NOTE — PROGRESS NOTES
Dear Paco,    Here is a summary of your recent test results:  -T3 free was normal - continue same dose of medication and recheck these labs in ~ 3 months.   -Normal morning cortisol level.  -DHEA levels are normal.    For additional lab test information, labtestsonline.org is an excellent reference.    Thank you very much for trusting me and [facility].     Healthy regards,  José Miguel Gaviria MD

## 2018-10-12 LAB — TRYPTASE SERPL-MCNC: 6.6 UG/L

## 2018-10-12 NOTE — PROGRESS NOTES
Dear Paco,    Here is a summary of your recent test results:  -All of your labs are normal.    For additional lab test information, labtestsonline.org is an excellent reference.           Thank you very much for trusting me and [facility].     Healthy regards,  José Miguel Gaviria MD

## 2018-11-20 ENCOUNTER — MYC MEDICAL ADVICE (OUTPATIENT)
Dept: FAMILY MEDICINE | Facility: CLINIC | Age: 53
End: 2018-11-20

## 2018-11-20 DIAGNOSIS — R53.83 OTHER FATIGUE: ICD-10-CM

## 2018-11-20 RX ORDER — METHYLPREDNISOLONE 4 MG/1
4-8 TABLET ORAL DAILY
Qty: 180 TABLET | Refills: 0 | Status: SHIPPED | OUTPATIENT
Start: 2018-11-20 | End: 2019-02-12

## 2018-11-20 NOTE — TELEPHONE ENCOUNTER
Mychart note sent to patient.    Nataly Vazquez, BS, RN, N  [facility]) [phone]

## 2018-11-20 NOTE — TELEPHONE ENCOUNTER
Please see my chart message below     Please review and advise     Thank you     Stacy Tipton RN, BSN  [facility]

## 2018-12-10 ENCOUNTER — TRANSFERRED RECORDS (OUTPATIENT)
Dept: HEALTH INFORMATION MANAGEMENT | Facility: CLINIC | Age: 53
End: 2018-12-10

## 2018-12-31 DIAGNOSIS — E03.9 HYPOTHYROIDISM, UNSPECIFIED TYPE: ICD-10-CM

## 2018-12-31 DIAGNOSIS — R53.83 OTHER FATIGUE: ICD-10-CM

## 2018-12-31 DIAGNOSIS — F41.1 GENERALIZED ANXIETY DISORDER: ICD-10-CM

## 2018-12-31 LAB
CORTIS SERPL-MCNC: 1.9 UG/DL (ref 4–22)
DHEA-S SERPL-MCNC: 157 UG/DL (ref 80–560)
T3FREE SERPL-MCNC: 2.8 PG/ML (ref 2.3–4.2)
T4 FREE SERPL-MCNC: 1.01 NG/DL (ref 0.76–1.46)
TSH SERPL DL<=0.005 MIU/L-ACNC: 0.17 MU/L (ref 0.4–4)

## 2018-12-31 PROCEDURE — 84439 ASSAY OF FREE THYROXINE: CPT | Performed by: FAMILY MEDICINE

## 2018-12-31 PROCEDURE — 84481 FREE ASSAY (FT-3): CPT | Performed by: FAMILY MEDICINE

## 2018-12-31 PROCEDURE — 36415 COLL VENOUS BLD VENIPUNCTURE: CPT | Performed by: FAMILY MEDICINE

## 2018-12-31 PROCEDURE — 82533 TOTAL CORTISOL: CPT | Performed by: FAMILY MEDICINE

## 2018-12-31 PROCEDURE — 84443 ASSAY THYROID STIM HORMONE: CPT | Performed by: FAMILY MEDICINE

## 2018-12-31 PROCEDURE — 82627 DEHYDROEPIANDROSTERONE: CPT | Performed by: FAMILY MEDICINE

## 2019-01-24 NOTE — RESULT ENCOUNTER NOTE
Dear Paco,    Here is a summary of your recent test results:    Thyroid labs looked okay.  Cortisol is down.    I see you saw these results online and let me know if you have any questions.     Healthy regards,  José Miguel Gaviria MD

## 2019-02-01 ENCOUNTER — OFFICE VISIT (OUTPATIENT)
Dept: ENDOCRINOLOGY | Facility: CLINIC | Age: 54
End: 2019-02-01
Payer: COMMERCIAL

## 2019-02-01 VITALS
DIASTOLIC BLOOD PRESSURE: 94 MMHG | HEART RATE: 89 BPM | SYSTOLIC BLOOD PRESSURE: 140 MMHG | HEIGHT: 73 IN | BODY MASS INDEX: 29.06 KG/M2 | WEIGHT: 219.3 LBS

## 2019-02-01 DIAGNOSIS — R79.89 LOW SERUM CORTISOL LEVEL: ICD-10-CM

## 2019-02-01 DIAGNOSIS — N52.9 ERECTILE DYSFUNCTION, UNSPECIFIED ERECTILE DYSFUNCTION TYPE: ICD-10-CM

## 2019-02-01 DIAGNOSIS — E03.9 HYPOTHYROIDISM, UNSPECIFIED TYPE: Primary | ICD-10-CM

## 2019-02-01 LAB
ALBUMIN SERPL-MCNC: 3.9 G/DL (ref 3.4–5)
ALP SERPL-CCNC: 99 U/L (ref 40–150)
ALT SERPL W P-5'-P-CCNC: 36 U/L (ref 0–70)
ANION GAP SERPL CALCULATED.3IONS-SCNC: 7 MMOL/L (ref 3–14)
AST SERPL W P-5'-P-CCNC: 18 U/L (ref 0–45)
BILIRUB SERPL-MCNC: 0.3 MG/DL (ref 0.2–1.3)
BUN SERPL-MCNC: 16 MG/DL (ref 7–30)
CALCIUM SERPL-MCNC: 9 MG/DL (ref 8.5–10.1)
CHLORIDE SERPL-SCNC: 106 MMOL/L (ref 94–109)
CO2 SERPL-SCNC: 24 MMOL/L (ref 20–32)
CREAT SERPL-MCNC: 0.94 MG/DL (ref 0.66–1.25)
FSH SERPL-ACNC: 6.6 IU/L (ref 0.7–10.8)
GFR SERPL CREATININE-BSD FRML MDRD: >90 ML/MIN/{1.73_M2}
GLUCOSE SERPL-MCNC: 93 MG/DL (ref 70–99)
POTASSIUM SERPL-SCNC: 3.6 MMOL/L (ref 3.4–5.3)
PROT SERPL-MCNC: 7.8 G/DL (ref 6.8–8.8)
SODIUM SERPL-SCNC: 137 MMOL/L (ref 133–144)
T3FREE SERPL-MCNC: 3 PG/ML (ref 2.3–4.2)
T4 FREE SERPL-MCNC: 0.97 NG/DL (ref 0.76–1.46)
TSH SERPL DL<=0.005 MIU/L-ACNC: 0.34 MU/L (ref 0.4–4)

## 2019-02-01 PROCEDURE — 99244 OFF/OP CNSLTJ NEW/EST MOD 40: CPT | Mod: 25 | Performed by: INTERNAL MEDICINE

## 2019-02-01 PROCEDURE — 84481 FREE ASSAY (FT-3): CPT | Performed by: INTERNAL MEDICINE

## 2019-02-01 PROCEDURE — 36415 COLL VENOUS BLD VENIPUNCTURE: CPT | Performed by: INTERNAL MEDICINE

## 2019-02-01 PROCEDURE — 84443 ASSAY THYROID STIM HORMONE: CPT | Performed by: INTERNAL MEDICINE

## 2019-02-01 PROCEDURE — 86376 MICROSOMAL ANTIBODY EACH: CPT | Performed by: INTERNAL MEDICINE

## 2019-02-01 PROCEDURE — 80053 COMPREHEN METABOLIC PANEL: CPT | Performed by: INTERNAL MEDICINE

## 2019-02-01 PROCEDURE — 84439 ASSAY OF FREE THYROXINE: CPT | Performed by: INTERNAL MEDICINE

## 2019-02-01 PROCEDURE — 84403 ASSAY OF TOTAL TESTOSTERONE: CPT | Performed by: INTERNAL MEDICINE

## 2019-02-01 PROCEDURE — 83001 ASSAY OF GONADOTROPIN (FSH): CPT | Performed by: INTERNAL MEDICINE

## 2019-02-01 PROCEDURE — 99358 PROLONG SERVICE W/O CONTACT: CPT | Performed by: INTERNAL MEDICINE

## 2019-02-01 RX ORDER — LEVOTHYROXINE SODIUM 137 UG/1
137 TABLET ORAL DAILY
Qty: 30 TABLET | Refills: 3 | Status: SHIPPED | OUTPATIENT
Start: 2019-02-01 | End: 2019-03-01

## 2019-02-01 RX ORDER — LEVOTHYROXINE SODIUM 137 UG/1
TABLET ORAL
COMMUNITY
Start: 2018-07-17 | End: 2019-02-01

## 2019-02-01 RX ORDER — HYDROCORTISONE 5 MG/1
TABLET ORAL
COMMUNITY
Start: 2018-11-13 | End: 2019-02-26

## 2019-02-01 ASSESSMENT — MIFFLIN-ST. JEOR: SCORE: 1893.62

## 2019-02-01 NOTE — PROGRESS NOTES
Name: Paco Fatima is a 54 year old man, seen at the request of Dr. Sonu Gaviria for evaluation of     Chief Complaint   Patient presents with     Endocrine Problem     hypothyroidism, possible adrenal insufficiency       HPI:  Recent issues:  Here for an endocrinology evaluation.  Reviewed medical history from patient and Epic chart record  Symptoms with fatigue, achiness joints and muscles, leg weakness, brain fog   Variable weight gain, some slowing of urine flow        Adrenal:  ~2014. Began treatment with steroid medication   Details of initial diagnosis unclear, but he recall having low energy, arthralgias   Previous [facility] labs include:      Since starting steroid medication... Has used hydrocortisone, Medrol, Prednisone   Previous diagnosis of synovitis, previous knee and shoulder injections   Had more recent left knee steroid injection 12/2018  In recent months, took Medtrol through 12/2018, then switched to hydrocortisone medication  Has been taking hydrocortisone TID  Recent [facility] labs include:  Lab Results   Component Value Date    PROLACTIN 6 07/25/2014    FSH 6.6 02/01/2019    LH 6.4 12/02/2016    ESTROGEN 25 03/21/2016    TESTOSTTOTAL 752 01/26/2017    FT 19.26 01/26/2017    ACTH 32 03/03/2015    BHUPENDRA 1.9 (L) 12/31/2018    TSH 0.34 (L) 02/01/2019    T4 0.97 02/01/2019    SG 1.020 07/19/2016      Current dose:  Hydrocortisone 5 mg po TID (none today)  Also takes DHEA supplement      Thyroid:  History of hypothyroidism since several years ago  Details of initial lab testing unclear, though he recalls having low-normal range thyroid tests  ~2012. Began levothyroxine medication treatment  No history of goiter or thyroid nodules  Subsequently switched to Claude thyroid  Switched to liothyronine monotherapy 5 mcg QID for few months until 12/2018  Lab Results   Component Value Date    TSH 0.34 (L) 02/01/2019    T4 0.97 02/01/2019    FT3 3.0 02/01/2019    TPO <10 12/03/2012     Unclear whether he  makes his own decisions on thyroid dosing or direction from physician(s)  12/2018.  Restarted levothyroxine 0.137 mg daily, liothyronine 5 mcg TID      Other endocrine history:  Approx 9-10 years ago, took testosterone medication  Recalls having low-normal testosterone blood levels, but details not available  Took Androgel 2010, then switched to testosterone cypionate injections ~2010- 2015  Treatment with chorionic gonadotropin IM injections 3x/week in 2015, per Dr. Gaviria  Has also taken anastrozole [id number]  Denies taking testosterone medication for past several years    Previous [facility] labs include:          Fam Hx:   Thyroid Mother, sister- hypothyroidism   Adrenal None known   T1DM  Mother, older sister   MS  Younger sister      Marrried, lives in [facility]  Sees Dr. Sonu Gaviria/[facility] for general medicine evaluations.    PMH/PSH:  Past Medical History:   Diagnosis Date     Essential hypertension with goal blood pressure less than 140/90 7/19/2016     DAY (generalized anxiety disorder)      GERD (gastroesophageal reflux disease)      h/o Subclinical hypothyroidism 2/12/2013     Other fatigue 3/21/2016     Past Surgical History:   Procedure Laterality Date     COLONOSCOPY N/A 9/18/2018    Procedure: COLONOSCOPY;;  Surgeon: Noé Sharma MD;  Location: [facility]     ESOPHAGOSCOPY, GASTROSCOPY, DUODENOSCOPY (EGD), COMBINED N/A 9/18/2018    Procedure: COMBINED ESOPHAGOSCOPY, GASTROSCOPY, DUODENOSCOPY (EGD);  ESOPHAGOSCOPY, GASTROSCOPY, DUODENOSCOPY (EGD) and COLONOSCOPY ([facility])  ;  Surgeon: Noé Sharma MD;  Location: [facility]     HC REMOVAL OF TONSILS,<12 Y/O      Tonsillectomy, under 13 yrs     HC REPAIR RECURR INGUIN DMITRY,REDUCIBL  1987    Hernia, inguinal     SURGICAL HISTORY OF -   1993    deviated septum repair       Family Hx:  Family History   Problem Relation Age of Onset     C.A.D. Maternal Grandmother      C.A.D. Maternal Grandfather      Cancer Father           age 30-Sarcoma     Other Cancer Father      Psychotic Disorder Mother         anxiety     Diabetes Mother         DM-1     Thyroid Disease Mother      Musculoskeletal Disorder Sister 38        MS     Thyroid Disease Sister      Diabetes Sister         DM-1     Lipids No family hx of      Hypertension No family hx of      Prostate Cancer No family hx of      Colon Cancer No family hx of          Social Hx:  Social History     Socioeconomic History     Marital status:      Spouse name: Ximena     Number of children: 3     Years of education: Not on file     Highest education level: Not on file   Social Needs     Financial resource strain: Not on file     Food insecurity - worry: Not on file     Food insecurity - inability: Not on file     Transportation needs - medical: Not on file     Transportation needs - non-medical: Not on file   Occupational History     Occupation: Sales     Employer: [employer]   Tobacco Use     Smoking status: Never Smoker     Smokeless tobacco: Never Used   Substance and Sexual Activity     Alcohol use: Yes     Comment: 3 beers weekly     Drug use: No     Sexual activity: Yes     Partners: Female     Birth control/protection: Surgical   Other Topics Concern      Service Not Asked     Blood Transfusions Not Asked     Caffeine Concern Yes     Comment: 3 serv /day- some heartburn     Occupational Exposure Not Asked     Hobby Hazards Not Asked     Sleep Concern No     Stress Concern Not Asked     Weight Concern Not Asked     Special Diet Not Asked     Back Care Not Asked     Exercise Not Asked     Bike Helmet Not Asked     Seat Belt Yes     Self-Exams Yes     Comment: occ- recommended Monthly     Parent/sibling w/ CABG, MI or angioplasty before 65F 55M? No   Social History Narrative     Not on file          MEDICATIONS:  has a current medication list which includes the following prescription(s): hydrocortisone, levothyroxine, liothyronine, minocycline, omeprazole,  "prasterone (dhea), propranolol, and methylprednisolone.    ROS:     ROS: 10 point ROS neg other than the symptoms noted above in the HPI.    GENERAL:  fatigue, wt stable; denies fevers, chills, night sweats.    HEENT: no dysphagia, odonophagia, diplopia, neck pain  THYROID:  no apparent hyper or hypothyroid symptoms  CV: no chest pain, pressure, palpitations  LUNGS: no SOB, MACDONALD, cough, wheezing   ABDOMEN: no diarrhea, constipation, abdominal pain  EXTREMITIES: no rashes, ulcers, edema  NEUROLOGY: no headaches, denies changes in vision, tingling, extremitiy numbness   MSK: generalized achiness; denies muscle weakness  SKIN: no rashes or lesions  : mild erectile dysfunction; good urine flow, denies nocturia  PSYCH:  stable mood, no significant anxiety or depression reported  ENDOCRINE: no heat or cold intolerance    Physical Exam   VS: BP (!) 140/94   Pulse 89   Ht 1.854 m (6' 1\")   Wt 99.5 kg (219 lb 4.8 oz)   BMI 28.93 kg/m    GENERAL: AXOX3, NAD, well dressed, answering questions appropriately, appears stated age.  THYROID:  normal gland, no apparent nodules or goiter  HEENT: neck non-tender, no exopthalmous, no proptosis, EOMI  CV: RRR, no rubs, gallops, no murmurs  LUNGS: CTAB, no wheezes, rales, or ronchi  ABDOMEN: mildly obese, soft, nontender, nondistended  EXTREMITIES: no edema, no lesions  NEUROLOGY: CN grossly intact, no tremors  MSK: grossly intact  SKIN: no rashes, no lesions    LABS:    All pertinent notes, labs, and images personally reviewed by me.     A/P:  Encounter Diagnoses   Name Primary?     Hypothyroidism, unspecified type Yes     Low serum cortisol level (H)      Erectile dysfunction, unspecified erectile dysfunction type        Comments:   Reviewed complicated health history, also thyroid, adrenal, and gonad issues.  Very complicated health history with no clear detail of possible hypothyroidism, adrenal insufficiency, or hypogonadism diagnoses.  Unable to confirm primary adrenal " "insufficiency diagnosis with his chronic steroid medication use  Discouraged patient from making his own decisions on the dosing of prescription adrenal (hydrocortisone or Medrol), thyroid medications    Plan:  Discussed general issues with possible adrenal and thyroid gland disease  Reviewed adrenal and thyroid gland anatomy and hormone physiology  Discussed lab tests used to assess patient adrenal and thyroid hormone levels  We reviewed potential symptoms and health problems from taking too much hydrocortisone, levothyroxine or liothyronine medication    Recommend:  Encouraged a more consistent, logical dose plan with the steroid and thyroid medications  Change to levothyroxine 0.137 mg + liothyronine 5 mcg dose each morning  Check thyroid levels today    Consolidate steroid medication as hydrocortisone 10 mg QAM and 5 mg QPM  Goal for gradual taper of the steroid medication  Would not use Prednisone or Medrol at this time  Would not use DHEA (or other adrenal) supplements  Since probable adrenal gland suppression from chronic treatment, will need to use \"stress dosing\" for acute illness or surgery  Monitor for symptom changes  Check lab tests including testosterone and gonadotropin levels    Patient to follow up with Primary Care provider regarding elevated blood pressure.  Addressed patient questions today    Patient Instructions   New medication dose plan:    Thyroid:  Take levothyroxine 0.137 mg daily with liothyronine 5 mcg 1-tablet daily    Adrenal:  Take hydrocortisone 5 mcg 2-tablets morning and 1 tablet late afternoon (15 mg daily total)    Check lab tests today  Contact our office if questions or new issues on this dose plan  Followup appt in 4 weeks      Labs ordered today:   Orders Placed This Encounter   Procedures     TSH     T4 free     T3 Free     Thyroid peroxidase antibody     Comprehensive metabolic panel     Testosterone total     Follicle stimulating hormone     Radiology/Consults ordered " today: None    More than 50% of the time spent with Mr. Fatima on counseling / coordinating his care.  Total appointment time was 50 minutes.  Spent an additional 40 minutes reviewing details of his previous lab testing, medication use from Epic record.    Follow-up:  4-5 weeks      Raheem Knowles MD  Endocrinology  [facility]  CC: Sonu Gaviria

## 2019-02-01 NOTE — PATIENT INSTRUCTIONS
New medication dose plan:    Thyroid:  Take levothyroxine 0.137 mg daily with liothyronine 5 mcg 1-tablet daily    Adrenal:  Take hydrocortisone 5 mcg 2-tablets morning and 1 tablet late afternoon (15 mg daily total)    Check lab tests today  Contact our office if questions or new issues on this dose plan  Followup appt in 4 weeks

## 2019-02-05 LAB — THYROPEROXIDASE AB SERPL-ACNC: <10 IU/ML

## 2019-02-06 LAB — TESTOST SERPL-MCNC: 408 NG/DL (ref 240–950)

## 2019-02-12 ENCOUNTER — OFFICE VISIT (OUTPATIENT)
Dept: FAMILY MEDICINE | Facility: CLINIC | Age: 54
End: 2019-02-12
Payer: COMMERCIAL

## 2019-02-12 VITALS
DIASTOLIC BLOOD PRESSURE: 80 MMHG | HEART RATE: 96 BPM | SYSTOLIC BLOOD PRESSURE: 124 MMHG | WEIGHT: 225 LBS | TEMPERATURE: 98.5 F | OXYGEN SATURATION: 97 % | HEIGHT: 73 IN | BODY MASS INDEX: 29.82 KG/M2

## 2019-02-12 DIAGNOSIS — L73.9 FOLLICULITIS: ICD-10-CM

## 2019-02-12 PROCEDURE — 10060 I&D ABSCESS SIMPLE/SINGLE: CPT | Performed by: FAMILY MEDICINE

## 2019-02-12 PROCEDURE — 87070 CULTURE OTHR SPECIMN AEROBIC: CPT | Performed by: FAMILY MEDICINE

## 2019-02-12 RX ORDER — CEPHALEXIN 500 MG/1
500 CAPSULE ORAL 3 TIMES DAILY
Qty: 21 CAPSULE | Refills: 0 | Status: SHIPPED | OUTPATIENT
Start: 2019-02-12 | End: 2019-02-26

## 2019-02-12 ASSESSMENT — MIFFLIN-ST. JEOR: SCORE: 1919.47

## 2019-02-12 NOTE — PROGRESS NOTES
"  SUBJECTIVE:                                                      Paco Fatima is a 54 year old male who presents to clinic today for the following health issues:    Neck Lump / Inflamed Nodules   Paco reports having a suspicious medium lump on left side of neck for 2 weeks. The site is overly tender and he would like to have it removed.        Problem list and histories reviewed & adjusted, as indicated.  Additional history: as documented    ROS:  Constitutional, HEENT, cardiovascular, pulmonary, GI, , musculoskeletal, neuro, skin, endocrine and psych systems are negative, except as otherwise noted.  This document serves as a record of the services and decisions personally performed and made by Sonu Gaviria MD. It was created on his behalf by Real Velasquez, a trained medical scribe. The creation of this document is based the provider's statements to the medical scribe.  Scribe Real Velasquez 11:16 AM, February 12, 2019    OBJECTIVE:                                                    /80   Pulse 96   Temp 98.5  F (36.9  C) (Oral)   Ht 1.854 m (6' 1\")   Wt 102.1 kg (225 lb)   SpO2 97%   BMI 29.69 kg/m   Body mass index is 29.69 kg/m .   GENERAL: healthy, alert, well nourished, well hydrated, no distress  HENT: ear canals- normal; TMs- normal; Nose- normal; Mouth- no ulcers, no lesions  NECK: no tenderness, no adenopathy, no asymmetry, no masses, no stiffness; thyroid- normal to palpation  RESP: lungs clear to auscultation - no rales, no rhonchi, no wheezes  CV: regular rates and rhythm, normal S1 S2, no S3 or S4 and no murmur, no click or rub -  ABDOMEN: soft, no tenderness, no  hepatosplenomegaly, no masses, normal bowel sounds  SKIN: 8mm red nodular cyst-like mass on his left neck inferior to the jaw with purulent , no suspicious lesions, no rashes    Procedure Note  After informed consent was obtained, using Hibiclens for cleansing and 1% Lidocaine with epinephrine for anesthetic, with sterile technique, " "incision and drainage of abscess was performed. 1 6-0 suture was applied at the site. Antibiotic dressing is applied, and wound care instructions provided.  Be alert for any signs of cutaneous infection. The procedure was well tolerated without complications. A sample of discharge was taken and sent to pathology.  Follow up: The patient may return in 8-10 days for suture removal.     ASSESSMENT/PLAN:                                                    Paco was seen today for derm problem.    Diagnoses and all orders for this visit:    Folliculitis - Creamy purulent present upon drainage -- culture taken, start keflex -- see procedure note.   -     Wound Culture Aerobic Bacterial  -     Gram stain; Future  -     cephALEXin (KEFLEX) 500 MG capsule; Take 1 capsule (500 mg) by mouth 3 times daily for 7 days  -     DRAIN SKIN ABSCESS SIMPLE/SINGLE      Risks, benefits and alternatives of treatments discussed. Plan agreed on.      Followup: Return in about 8 days (around 2/20/2019) for Suture Removal.    See patient instructions.     BMI:   Estimated body mass index is 29.69 kg/m  as calculated from the following:    Height as of this encounter: 1.854 m (6' 1\").    Weight as of this encounter: 102.1 kg (225 lb).   Weight management plan: Discussed healthy diet and exercise guidelines    The information in this document, created by the medical scribe for me, accurately reflects the services I personally performed and the decisions made by me. I have reviewed and approved this document for accuracy prior to leaving the patient care area.  11:34 AM, 02/12/19        José Miguel Gaviria MD   Pager: [phone]    "

## 2019-02-14 LAB
BACTERIA SPEC CULT: NORMAL
Lab: NORMAL
SPECIMEN SOURCE: NORMAL

## 2019-02-14 NOTE — RESULT ENCOUNTER NOTE
Dear Paco,    Here is a summary of your recent test results:  -culture showed normal skin organisms.  How is your neck doing.    Thank you very much for trusting me and [facility].     Healthy regards,  José Miguel Gaviria MD

## 2019-02-26 ENCOUNTER — OFFICE VISIT (OUTPATIENT)
Dept: ENDOCRINOLOGY | Facility: CLINIC | Age: 54
End: 2019-02-26
Payer: COMMERCIAL

## 2019-02-26 VITALS
WEIGHT: 228 LBS | SYSTOLIC BLOOD PRESSURE: 147 MMHG | DIASTOLIC BLOOD PRESSURE: 95 MMHG | HEIGHT: 73 IN | BODY MASS INDEX: 30.22 KG/M2 | HEART RATE: 89 BPM

## 2019-02-26 DIAGNOSIS — E03.9 HYPOTHYROIDISM, UNSPECIFIED TYPE: Primary | ICD-10-CM

## 2019-02-26 DIAGNOSIS — R79.89 LOW SERUM CORTISOL LEVEL: ICD-10-CM

## 2019-02-26 LAB
CORTIS SERPL-MCNC: 5.2 UG/DL (ref 4–22)
T3FREE SERPL-MCNC: 2.6 PG/ML (ref 2.3–4.2)

## 2019-02-26 PROCEDURE — 84439 ASSAY OF FREE THYROXINE: CPT | Performed by: INTERNAL MEDICINE

## 2019-02-26 PROCEDURE — 84481 FREE ASSAY (FT-3): CPT | Performed by: INTERNAL MEDICINE

## 2019-02-26 PROCEDURE — 82533 TOTAL CORTISOL: CPT | Performed by: INTERNAL MEDICINE

## 2019-02-26 PROCEDURE — 84443 ASSAY THYROID STIM HORMONE: CPT | Performed by: INTERNAL MEDICINE

## 2019-02-26 PROCEDURE — 36415 COLL VENOUS BLD VENIPUNCTURE: CPT | Performed by: INTERNAL MEDICINE

## 2019-02-26 PROCEDURE — 99214 OFFICE O/P EST MOD 30 MIN: CPT | Performed by: INTERNAL MEDICINE

## 2019-02-26 PROCEDURE — 82024 ASSAY OF ACTH: CPT | Performed by: INTERNAL MEDICINE

## 2019-02-26 ASSESSMENT — MIFFLIN-ST. JEOR: SCORE: 1933.08

## 2019-02-26 NOTE — PROGRESS NOTES
Name: Paco Fatima    Chief Complaint   Patient presents with     Endocrine Problem     Hypothyroidism and Adrenal issue       HPI:  Recent issues:  Here for an thyroid and adrenal evaluation.  He made some changes with his medications since last visit.        Adrenal:  ~2014. Began treatment with steroid medication   Details of initial diagnosis unclear, but he recall having low energy, arthralgias   Previous [facility] labs include:      Since starting steroid medication... Has used hydrocortisone, Medrol, Prednisone   Previous diagnosis of synovitis, previous knee and shoulder injections   Had more recent left knee steroid injection 12/2018  During Fall 2018, took Medtrol through 12/2018, then switched to hydrocortisone medication  Has been taking hydrocortisone TID, also DHEA supplement  Recent [facility] labs include:  Lab Results   Component Value Date    PROLACTIN 6 07/25/2014    FSH 6.6 02/01/2019    LH 6.4 12/02/2016    ESTROGEN 25 03/21/2016    TESTOSTTOTAL 408 02/01/2019    FT 19.26 01/26/2017    ACTH 32 03/03/2015    BHUPENDRA 1.9 (L) 12/31/2018    TSH 0.34 (L) 02/01/2019    T4 0.97 02/01/2019    SG 1.020 07/19/2016 2/2/19. Patient decided to discontinue hydrocortisone medication  Current dose of hydrocortisone:  none        Thyroid:  History of hypothyroidism since several years ago  Details of initial lab testing unclear, though he recalls having low-normal range thyroid tests  ~2012. Began levothyroxine medication treatment  No history of goiter or thyroid nodules  Subsequently switched to [place] thyroid  Switched to liothyronine monotherapy 5 mcg QID for few months until 12/2018  Lab Results   Component Value Date    TSH 0.34 (L) 02/01/2019    T4 0.97 02/01/2019    FT3 3.0 02/01/2019    TPO <10 02/01/2019     Unclear whether he makes his own decisions on thyroid dosing or direction from physician(s)  12/2018.  Restarted levothyroxine 0.137 mg daily, liothyronine 5 mcg TID    2/1/19. Initial endocrinology  evaluation with me, [facility]  Changed dosing routine with the thyroid medications  Current dose:  Levothyroxine 0.137 mg + liothyronine 5 mcg each morning    Recent symptoms:   Morning energy better, tired in afternoons and evening, cold sensation, dry forehead and hair, wt gain 7#/1 mo   Arthralgias at left knee, left wrist and shoulder   Denies nausea        Other endocrine history:  Approx 9-10 years ago, took testosterone medication  Recalls having low-normal testosterone blood levels, but details not available  Took Androgel 2010, then switched to testosterone cypionate injections ~2010- 2015  Treatment with chorionic gonadotropin IM injections 3x/week in 2015, per Dr. Gaviria  Has also taken anastrozole [id number]  Denies taking testosterone medication for past several years    Previous [facility] labs include:          Fam Hx:   Thyroid Mother, sister- hypothyroidism   Adrenal None known   T1DM  Mother, older sister   MS  Younger sister      Marrried, lives in [facility]  Sees Dr. Sonu Gaviria/[facility] for general medicine evaluations.  Had seen Dr. Kenna Gaitan/[facility], [place]    PMH/PSH:  Past Medical History:   Diagnosis Date     Essential hypertension with goal blood pressure less than 140/90 7/19/2016     DAY (generalized anxiety disorder)      GERD (gastroesophageal reflux disease)      h/o Subclinical hypothyroidism 2/12/2013     Other fatigue 3/21/2016     Past Surgical History:   Procedure Laterality Date     COLONOSCOPY N/A 9/18/2018    Procedure: COLONOSCOPY;;  Surgeon: Noé Sharma MD;  Location:  GI     ESOPHAGOSCOPY, GASTROSCOPY, DUODENOSCOPY (EGD), COMBINED N/A 9/18/2018    Procedure: COMBINED ESOPHAGOSCOPY, GASTROSCOPY, DUODENOSCOPY (EGD);  ESOPHAGOSCOPY, GASTROSCOPY, DUODENOSCOPY (EGD) and COLONOSCOPY ([facility])  ;  Surgeon: Noé Sharma MD;  Location: [facility]     HC REMOVAL OF TONSILS,<13 Y/O      Tonsillectomy, under 13 yrs     HC REPAIR RECURR INGUIN  DMITRY,REDUCIBL  1987    Hernia, inguinal     SURGICAL HISTORY OF -       deviated septum repair       Family Hx:  Family History   Problem Relation Age of Onset     ADEOLA Maternal Grandmother      ADEOLA Maternal Grandfather      Cancer Father          age 30-Sarcoma     Other Cancer Father      Psychotic Disorder Mother         anxiety     Diabetes Mother         DM-1     Thyroid Disease Mother      Musculoskeletal Disorder Sister 38        MS     Thyroid Disease Sister      Diabetes Sister         DM-1     Lipids No family hx of      Hypertension No family hx of      Prostate Cancer No family hx of      Colon Cancer No family hx of          Social Hx:  Social History     Socioeconomic History     Marital status:      Spouse name: Ximena     Number of children: 3     Years of education: Not on file     Highest education level: Not on file   Occupational History     Occupation: Sales     Employer: [employer]   Social Needs     Financial resource strain: Not on file     Food insecurity:     Worry: Not on file     Inability: Not on file     Transportation needs:     Medical: Not on file     Non-medical: Not on file   Tobacco Use     Smoking status: Never Smoker     Smokeless tobacco: Never Used   Substance and Sexual Activity     Alcohol use: Yes     Comment: 3 beers weekly     Drug use: No     Sexual activity: Yes     Partners: Female     Birth control/protection: Surgical   Lifestyle     Physical activity:     Days per week: Not on file     Minutes per session: Not on file     Stress: Not on file   Relationships     Social connections:     Talks on phone: Not on file     Gets together: Not on file     Attends Alevism service: Not on file     Active member of club or organization: Not on file     Attends meetings of clubs or organizations: Not on file     Relationship status: Not on file     Intimate partner violence:     Fear of current or ex partner: Not on file     Emotionally abused: Not  "on file     Physically abused: Not on file     Forced sexual activity: Not on file   Other Topics Concern      Service Not Asked     Blood Transfusions Not Asked     Caffeine Concern Yes     Comment: 3 serv /day- some heartburn     Occupational Exposure Not Asked     Hobby Hazards Not Asked     Sleep Concern No     Stress Concern Not Asked     Weight Concern Not Asked     Special Diet Not Asked     Back Care Not Asked     Exercise Not Asked     Bike Helmet Not Asked     Seat Belt Yes     Self-Exams Yes     Comment: occ- recommended Monthly     Parent/sibling w/ CABG, MI or angioplasty before 65F 55M? No   Social History Narrative     Not on file          MEDICATIONS:  has a current medication list which includes the following prescription(s): levothyroxine, liothyronine, minocycline, omeprazole, prasterone (dhea), and propranolol.    ROS:     ROS: 10 point ROS neg other than the symptoms noted above in the HPI.    GENERAL: afternoon fatigue, wt gain; denies fevers, chills, night sweats.    HEENT: no dysphagia, odonophagia, diplopia, neck pain  THYROID:  no apparent hyper or hypothyroid symptoms  CV: no chest pain, pressure, palpitations  LUNGS: no SOB, MACDONALD, cough, wheezing   ABDOMEN: no diarrhea, constipation, abdominal pain  EXTREMITIES: no rashes, ulcers, edema  NEUROLOGY: no headaches, denies changes in vision, tingling, extremitiy numbness   MSK: achiness at left hip, wrist, shoulder; denies muscle weakness  SKIN: dry skin at forehead; no rashes or lesions  : mild erectile dysfunction; good urine flow, denies nocturia  PSYCH:  stable mood, no significant anxiety or depression reported  ENDOCRINE: no heat or cold intolerance    Physical Exam   VS: BP (!) 147/95   Pulse 89   Ht 1.854 m (6' 1\")   Wt 103.4 kg (228 lb)   BMI 30.08 kg/m    GENERAL: AXOX3, NAD, well dressed, answering questions appropriately, appears stated age.  THYROID:  normal gland, no apparent nodules or goiter  ABDOMEN: mildly " obese, soft, nontender, nondistended  EXTREMITIES: normal squat test; no edema, no lesions  NEUROLOGY: CN grossly intact, no tremors  MSK: grossly intact  SKIN: no rashes, no lesions    LABS:    All pertinent notes, labs, and images personally reviewed by me.     A/P:  Encounter Diagnoses   Name Primary?     Hypothyroidism, unspecified type Yes     Low serum cortisol level (H)     Chronic steroid medication use    Comments:   Very complicated health history with no clear detail of possible hypothyroidism, adrenal insufficiency, or hypogonadism diagnoses.  I am surprised he changed hydrocortisone dosing, since I have discouraged making own decisions on dosing of prescription meds.    Plan:  Discussed general issues with possible adrenal and thyroid gland disease  Reviewed adrenal and thyroid gland anatomy and hormone physiology  Discussed lab tests used to assess patient adrenal and thyroid hormone levels  We reviewed potential symptoms and health problems from taking too much hydrocortisone, levothyroxine or liothyronine medication    Recommend:  Continue current levothyroxine 0.137 mg + liothyronine 5 mcg dose each morning  Would not restart hydrocortisone, Prednisone or Medrol (glucocorticoid) medications at this time  Would not use DHEA (or other adrenal) supplements  Check hormone lab tests today   If normal cortisol, advise avoiding glucocorticoid medication use   If low cortisol, consider restarting low dose hydrocortisone QAM or BID, then slow taper  Monitor for symptom changes  No evidence for current hypogonadism (low testosterone levels)    Reasonable to keep the planned rheumatology evaluation later this week  Patient to follow up with Primary Care provider regarding elevated blood pressure.  Addressed patient questions today    Labs ordered today:   Orders Placed This Encounter   Procedures     Cortisol     Adrenal corticotropin     TSH     T4 free     T3 Free     Radiology/Consults ordered today:  None    More than 50% of the time spent with Mr. Fatima on counseling / coordinating his care.  Total appointment time was 25 minutes.    Follow-up:  3 mo or prn      Raheem Knowles MD  Endocrinology  [facility]  CC: ZAIRE Gaviria and CAROLYN Gaitan

## 2019-02-27 ENCOUNTER — TRANSFERRED RECORDS (OUTPATIENT)
Dept: HEALTH INFORMATION MANAGEMENT | Facility: CLINIC | Age: 54
End: 2019-02-27

## 2019-02-27 LAB
ACTH PLAS-MCNC: 16 PG/ML
T4 FREE SERPL-MCNC: 0.92 NG/DL (ref 0.76–1.46)
TSH SERPL DL<=0.005 MIU/L-ACNC: 1.54 MU/L (ref 0.4–4)

## 2019-02-28 ENCOUNTER — MYC MEDICAL ADVICE (OUTPATIENT)
Dept: ENDOCRINOLOGY | Facility: CLINIC | Age: 54
End: 2019-02-28

## 2019-03-01 DIAGNOSIS — E03.9 HYPOTHYROIDISM, UNSPECIFIED TYPE: ICD-10-CM

## 2019-03-01 RX ORDER — LEVOTHYROXINE SODIUM 150 UG/1
150 TABLET ORAL DAILY
Qty: 30 TABLET | Refills: 5 | Status: SHIPPED | OUTPATIENT
Start: 2019-03-01 | End: 2019-06-11

## 2019-04-01 ENCOUNTER — TELEPHONE (OUTPATIENT)
Dept: FAMILY MEDICINE | Facility: CLINIC | Age: 54
End: 2019-04-01

## 2019-04-01 DIAGNOSIS — E03.9 HYPOTHYROIDISM, UNSPECIFIED TYPE: ICD-10-CM

## 2019-04-01 DIAGNOSIS — E03.9 HYPOTHYROIDISM, UNSPECIFIED TYPE: Primary | ICD-10-CM

## 2019-04-01 PROCEDURE — 36415 COLL VENOUS BLD VENIPUNCTURE: CPT | Performed by: FAMILY MEDICINE

## 2019-04-01 PROCEDURE — 82533 TOTAL CORTISOL: CPT | Performed by: FAMILY MEDICINE

## 2019-04-01 PROCEDURE — 84480 ASSAY TRIIODOTHYRONINE (T3): CPT | Performed by: FAMILY MEDICINE

## 2019-04-01 PROCEDURE — 84439 ASSAY OF FREE THYROXINE: CPT | Performed by: FAMILY MEDICINE

## 2019-04-01 PROCEDURE — 82627 DEHYDROEPIANDROSTERONE: CPT | Performed by: FAMILY MEDICINE

## 2019-04-01 PROCEDURE — 84443 ASSAY THYROID STIM HORMONE: CPT | Performed by: FAMILY MEDICINE

## 2019-04-01 NOTE — TELEPHONE ENCOUNTER
Patient requesting labs for TSH, FT4, T3 free, Cortisol and DHEA. States he had a med change and needs these for prescription. Santos blood and will hold for 3 days. Time sensitive tests.    Thank you.

## 2019-04-02 LAB
CORTIS SERPL-MCNC: 9.8 UG/DL (ref 4–22)
T3 SERPL-MCNC: 119 NG/DL (ref 60–181)
T4 FREE SERPL-MCNC: 1.02 NG/DL (ref 0.76–1.46)
TSH SERPL DL<=0.005 MIU/L-ACNC: 0.19 MU/L (ref 0.4–4)

## 2019-04-03 LAB — DHEA-S SERPL-MCNC: 51 UG/DL (ref 80–560)

## 2019-04-08 ENCOUNTER — TRANSFERRED RECORDS (OUTPATIENT)
Dept: HEALTH INFORMATION MANAGEMENT | Facility: CLINIC | Age: 54
End: 2019-04-08

## 2019-06-10 NOTE — PROGRESS NOTES
Subjective     Paco Fatima is a 54 year old male who presents to clinic today for the following health issues:    HPI   Anxiety Follow-Up    How are you doing with your anxiety since your last visit? No change    Are you having other symptoms that might be associated with anxiety? No    Have you had a significant life event? No     Are you feeling depressed? No    Do you have any concerns with your use of alcohol or other drugs? No     Paco reports doing well with his mood without any change or complications.    Social History     Tobacco Use     Smoking status: Never Smoker     Smokeless tobacco: Never Used   Substance Use Topics     Alcohol use: Yes     Comment: 3 beers weekly     Drug use: No     DAY-7 SCORE 5/16/2013 7/29/2013 7/17/2018   Total Score 11 7 -   Total Score - - 1     PHQ 7/17/2018   PHQ-9 Total Score 2   Q9: Thoughts of better off dead/self-harm past 2 weeks Not at all     Hypothyroidism Follow-up    Since last visit, patient describes the following symptoms: Weight stable, no hair loss, no skin changes, no constipation, no loose stools    Paco reports that his adrenal glands have continued to under compensate. He currently use levothyroxine and liothyronine. Currently in touch with Dr. Alexandre (endocrinologist)    TSH   Date Value Ref Range Status   06/11/2019 1.53 0.40 - 4.00 mU/L Final     Reviewed and updated as needed this visit by provider:         Review of Systems   Constitutional, HEENT, cardiovascular, pulmonary, GI, , musculoskeletal, neuro, skin, endocrine and psych systems are negative, except as otherwise noted.  This document serves as a record of the services and decisions personally performed and made by Sonu Gaviria MD. It was created on his behalf by Real Velasquez, a trained medical scribe. The creation of this document is based the provider's statements to the medical scribe.  Scribe Real Velasquez 8:52 AM, June 11, 2019    Objective   /80   Pulse 86   Temp 98.5  F (36.9  C)  "(Oral)   Ht 1.854 m (6' 1\")   Wt 104.3 kg (230 lb)   SpO2 97%   BMI 30.34 kg/m   Body mass index is 30.34 kg/m .  Physical Exam   GENERAL: healthy, alert, well nourished, well hydrated, no distress  HENT: ear canals- normal; TMs- normal; Nose- normal; Mouth- no ulcers, no lesions  NECK: no tenderness, no adenopathy, no asymmetry, no masses, no stiffness; thyroid- normal to palpation  RESP: lungs clear to auscultation - no rales, no rhonchi, no wheezes  CV: regular rates and rhythm, normal S1 S2, no S3 or S4 and no murmur, no click or rub -  ABDOMEN: soft, no tenderness, no  hepatosplenomegaly, no masses, normal bowel sounds  MS: extremities- no gross deformities noted, no edema  SKIN: no suspicious lesions, no rashes  BACK: no CVA tenderness, no paralumbar tenderness    Assessment & Plan   Paco was seen today for recheck medication.    Diagnoses and all orders for this visit:    Hypothyroidism, unspecified type - doing okay, rechecking TSH levels. following up with endocrinology. Refilled, continue medications.   -     liothyronine (CYTOMEL) 5 MCG tablet; Take 1 tablet (5 mcg) by mouth daily  -     levothyroxine (SYNTHROID/LEVOTHROID) 150 MCG tablet; Take 1 tablet (150 mcg) by mouth daily  -     TSH  -     T4, free  -     T3, Free    Generalized anxiety disorder - Stable, doing well- rare medication use, refilled, continue:   -     propranolol (INDERAL) 40 MG tablet; Take 1 tablet (40 mg) by mouth 2 times daily as needed (situational anxiety)    Perioral Dermatitis - Stable, no concerns, refilled, continue:   -     minocycline (MINOCIN/DYNACIN) 100 MG capsule; Take 1 capsule (100 mg) by mouth daily uses as needed    Acne, unspecified acne type - Stable, no concerns, refilled, continue:   -     minocycline (MINOCIN/DYNACIN) 100 MG capsule; Take 1 capsule (100 mg) by mouth daily    Other fatigue - Checking levels.   -     Cortisol  -     DHEA sulfate  -     Adrenal corticotropin    Hypotestosteronism - " "Rechecking.   -     Testosterone, total      BMI:   Estimated body mass index is 30.08 kg/m  as calculated from the following:    Height as of 2/26/19: 1.854 m (6' 1\").    Weight as of 2/26/19: 103.4 kg (228 lb).   Weight management plan: Discussed healthy diet and exercise guidelines    See Patient Instructions    Return in about 6 months (around 12/11/2019) for Medication Recheck.    The information in this document, created by the medical scribe for me, accurately reflects the services I personally performed and the decisions made by me. I have reviewed and approved this document for accuracy prior to leaving the patient care area.  9:06 AM, 06/11/19 - Total visit time: 15 minutes.         José Miguel Gaviria MD   Pager - [phone]  [facility]    "

## 2019-06-11 ENCOUNTER — OFFICE VISIT (OUTPATIENT)
Dept: FAMILY MEDICINE | Facility: CLINIC | Age: 54
End: 2019-06-11
Payer: COMMERCIAL

## 2019-06-11 VITALS
HEART RATE: 86 BPM | BODY MASS INDEX: 30.48 KG/M2 | OXYGEN SATURATION: 97 % | WEIGHT: 230 LBS | HEIGHT: 73 IN | SYSTOLIC BLOOD PRESSURE: 124 MMHG | DIASTOLIC BLOOD PRESSURE: 80 MMHG | TEMPERATURE: 98.5 F

## 2019-06-11 DIAGNOSIS — L70.9 ACNE, UNSPECIFIED ACNE TYPE: ICD-10-CM

## 2019-06-11 DIAGNOSIS — E03.9 HYPOTHYROIDISM, UNSPECIFIED TYPE: Primary | ICD-10-CM

## 2019-06-11 DIAGNOSIS — F41.1 GENERALIZED ANXIETY DISORDER: ICD-10-CM

## 2019-06-11 DIAGNOSIS — R53.83 OTHER FATIGUE: ICD-10-CM

## 2019-06-11 DIAGNOSIS — L71.9 ROSACEA: ICD-10-CM

## 2019-06-11 DIAGNOSIS — E34.9 HYPOTESTOSTERONISM: ICD-10-CM

## 2019-06-11 LAB
CORTIS SERPL-MCNC: 9.2 UG/DL (ref 4–22)
T3FREE SERPL-MCNC: 2.9 PG/ML (ref 2.3–4.2)
T4 FREE SERPL-MCNC: 0.88 NG/DL (ref 0.76–1.46)
TSH SERPL DL<=0.005 MIU/L-ACNC: 1.53 MU/L (ref 0.4–4)

## 2019-06-11 PROCEDURE — 36415 COLL VENOUS BLD VENIPUNCTURE: CPT | Performed by: FAMILY MEDICINE

## 2019-06-11 PROCEDURE — 99214 OFFICE O/P EST MOD 30 MIN: CPT | Performed by: FAMILY MEDICINE

## 2019-06-11 PROCEDURE — 82024 ASSAY OF ACTH: CPT | Performed by: FAMILY MEDICINE

## 2019-06-11 PROCEDURE — 84443 ASSAY THYROID STIM HORMONE: CPT | Performed by: FAMILY MEDICINE

## 2019-06-11 PROCEDURE — 84403 ASSAY OF TOTAL TESTOSTERONE: CPT | Performed by: FAMILY MEDICINE

## 2019-06-11 PROCEDURE — 82533 TOTAL CORTISOL: CPT | Performed by: FAMILY MEDICINE

## 2019-06-11 PROCEDURE — 84439 ASSAY OF FREE THYROXINE: CPT | Performed by: FAMILY MEDICINE

## 2019-06-11 PROCEDURE — 82627 DEHYDROEPIANDROSTERONE: CPT | Performed by: FAMILY MEDICINE

## 2019-06-11 PROCEDURE — 84481 FREE ASSAY (FT-3): CPT | Performed by: FAMILY MEDICINE

## 2019-06-11 RX ORDER — LEVOTHYROXINE SODIUM 150 UG/1
150 TABLET ORAL DAILY
Qty: 90 TABLET | Refills: 1 | Status: SHIPPED | OUTPATIENT
Start: 2019-06-11 | End: 2019-06-21

## 2019-06-11 RX ORDER — MINOCYCLINE HYDROCHLORIDE 100 MG/1
100 CAPSULE ORAL DAILY
Qty: 60 CAPSULE | Refills: 2 | Status: SHIPPED | OUTPATIENT
Start: 2019-06-11 | End: 2019-12-10

## 2019-06-11 RX ORDER — PROPRANOLOL HYDROCHLORIDE 40 MG/1
40 TABLET ORAL 2 TIMES DAILY PRN
Qty: 30 TABLET | Refills: 3 | Status: SHIPPED | OUTPATIENT
Start: 2019-06-11 | End: 2019-12-10

## 2019-06-11 RX ORDER — LIOTHYRONINE SODIUM 5 UG/1
5 TABLET ORAL DAILY
Qty: 90 TABLET | Refills: 3 | Status: SHIPPED | OUTPATIENT
Start: 2019-06-11 | End: 2019-10-19 | Stop reason: ALTCHOICE

## 2019-06-11 ASSESSMENT — ANXIETY QUESTIONNAIRES
6. BECOMING EASILY ANNOYED OR IRRITABLE: SEVERAL DAYS
3. WORRYING TOO MUCH ABOUT DIFFERENT THINGS: NOT AT ALL
1. FEELING NERVOUS, ANXIOUS, OR ON EDGE: SEVERAL DAYS
2. NOT BEING ABLE TO STOP OR CONTROL WORRYING: NOT AT ALL
5. BEING SO RESTLESS THAT IT IS HARD TO SIT STILL: NOT AT ALL
7. FEELING AFRAID AS IF SOMETHING AWFUL MIGHT HAPPEN: NOT AT ALL
IF YOU CHECKED OFF ANY PROBLEMS ON THIS QUESTIONNAIRE, HOW DIFFICULT HAVE THESE PROBLEMS MADE IT FOR YOU TO DO YOUR WORK, TAKE CARE OF THINGS AT HOME, OR GET ALONG WITH OTHER PEOPLE: NOT DIFFICULT AT ALL
GAD7 TOTAL SCORE: 2

## 2019-06-11 ASSESSMENT — PATIENT HEALTH QUESTIONNAIRE - PHQ9
5. POOR APPETITE OR OVEREATING: NOT AT ALL
SUM OF ALL RESPONSES TO PHQ QUESTIONS 1-9: 1

## 2019-06-11 ASSESSMENT — MIFFLIN-ST. JEOR: SCORE: 1942.15

## 2019-06-12 LAB
ACTH PLAS-MCNC: 20 PG/ML
DHEA-S SERPL-MCNC: 36 UG/DL (ref 80–560)
TESTOST SERPL-MCNC: 402 NG/DL (ref 240–950)

## 2019-06-12 ASSESSMENT — ANXIETY QUESTIONNAIRES: GAD7 TOTAL SCORE: 2

## 2019-06-13 ENCOUNTER — MYC MEDICAL ADVICE (OUTPATIENT)
Dept: FAMILY MEDICINE | Facility: CLINIC | Age: 54
End: 2019-06-13

## 2019-06-13 NOTE — RESULT ENCOUNTER NOTE
Dear Paco,    Here is a summary of your recent test results:  -TSH (thyroid stimulating hormone) level is normal which indicates appropriate thyroid replacement dosing.  ADVISE: continuing same replacement dose and rechecking this in 6 months.  -Testosterone and cortisol levels are in the normal range.  DHEA s low but unclear significance.      For additional lab test information, labtestsonline.org is an excellent reference.    In addition, here is a list of due or overdue Health Maintenance reminders:  Preventive Care Visit due on 06/24/2011    Please call us at [phone] (or use [employer]) to address the above recommendations if needed.           Thank you very much for trusting me and [facility].     Healthy regards,  José Miguel Gaviria MD

## 2019-06-14 NOTE — TELEPHONE ENCOUNTER
Routing to PCP & Dr. Knowles for further review/recommendations/orders.    Legih Lee RN  [facility]

## 2019-06-14 NOTE — TELEPHONE ENCOUNTER
Hello,    Can you please look over his labs and let him know what you think about the Toremiphene request.    Thanks,  José Miguel Gaviria MD

## 2019-06-17 ENCOUNTER — MYC MEDICAL ADVICE (OUTPATIENT)
Dept: ENDOCRINOLOGY | Facility: CLINIC | Age: 54
End: 2019-06-17

## 2019-06-21 ENCOUNTER — MYC MEDICAL ADVICE (OUTPATIENT)
Dept: FAMILY MEDICINE | Facility: CLINIC | Age: 54
End: 2019-06-21

## 2019-06-21 DIAGNOSIS — E03.9 HYPOTHYROIDISM, UNSPECIFIED TYPE: ICD-10-CM

## 2019-06-21 RX ORDER — LEVOTHYROXINE SODIUM 175 UG/1
175 TABLET ORAL DAILY
Qty: 90 TABLET | Refills: 0 | Status: SHIPPED | OUTPATIENT
Start: 2019-06-21 | End: 2019-09-20

## 2019-06-21 NOTE — TELEPHONE ENCOUNTER
Patient calling about the dosage request. States that he would like to hold off on this. Patient will make an appointment to see Dr. Gaviria in 2 months for labs and discuss this then.    Ph: [phone]    Mark MARQUES  Patient Representative - [place]

## 2019-06-21 NOTE — TELEPHONE ENCOUNTER
Please see my chart message below     Please review and advise     Thank you     Stacy Tipton RN, BSN  [facility]

## 2019-09-06 ENCOUNTER — TELEPHONE (OUTPATIENT)
Dept: FAMILY MEDICINE | Facility: CLINIC | Age: 54
End: 2019-09-06

## 2019-09-06 DIAGNOSIS — F41.1 GENERALIZED ANXIETY DISORDER: ICD-10-CM

## 2019-09-06 DIAGNOSIS — R79.89 LOW SERUM CORTISOL LEVEL: ICD-10-CM

## 2019-09-06 DIAGNOSIS — E03.9 HYPOTHYROIDISM, UNSPECIFIED TYPE: Primary | ICD-10-CM

## 2019-09-06 DIAGNOSIS — R25.3 FASCICULATIONS OF MUSCLE: ICD-10-CM

## 2019-09-06 DIAGNOSIS — R53.83 FATIGUE, UNSPECIFIED TYPE: ICD-10-CM

## 2019-09-06 DIAGNOSIS — R53.83 OTHER FATIGUE: ICD-10-CM

## 2019-09-20 DIAGNOSIS — E03.9 HYPOTHYROIDISM, UNSPECIFIED TYPE: ICD-10-CM

## 2019-09-23 RX ORDER — LEVOTHYROXINE SODIUM 175 UG/1
TABLET ORAL
Qty: 90 TABLET | Refills: 0 | Status: SHIPPED | OUTPATIENT
Start: 2019-09-23 | End: 2019-10-19

## 2019-09-27 ENCOUNTER — HEALTH MAINTENANCE LETTER (OUTPATIENT)
Age: 54
End: 2019-09-27

## 2019-10-04 DIAGNOSIS — R53.83 FATIGUE, UNSPECIFIED TYPE: ICD-10-CM

## 2019-10-04 DIAGNOSIS — R79.89 LOW SERUM CORTISOL LEVEL: ICD-10-CM

## 2019-10-04 DIAGNOSIS — E03.9 HYPOTHYROIDISM, UNSPECIFIED TYPE: ICD-10-CM

## 2019-10-04 LAB
CORTIS SERPL-MCNC: 11.9 UG/DL (ref 4–22)
T3FREE SERPL-MCNC: 3.1 PG/ML (ref 2.3–4.2)
T4 FREE SERPL-MCNC: 1.14 NG/DL (ref 0.76–1.46)
TSH SERPL DL<=0.005 MIU/L-ACNC: 1.11 MU/L (ref 0.4–4)

## 2019-10-04 PROCEDURE — 36415 COLL VENOUS BLD VENIPUNCTURE: CPT | Performed by: FAMILY MEDICINE

## 2019-10-04 PROCEDURE — 82533 TOTAL CORTISOL: CPT | Performed by: FAMILY MEDICINE

## 2019-10-04 PROCEDURE — 84439 ASSAY OF FREE THYROXINE: CPT | Performed by: FAMILY MEDICINE

## 2019-10-04 PROCEDURE — 84443 ASSAY THYROID STIM HORMONE: CPT | Performed by: FAMILY MEDICINE

## 2019-10-04 PROCEDURE — 84481 FREE ASSAY (FT-3): CPT | Performed by: FAMILY MEDICINE

## 2019-10-04 PROCEDURE — 82627 DEHYDROEPIANDROSTERONE: CPT | Performed by: FAMILY MEDICINE

## 2019-10-07 LAB — DHEA-S SERPL-MCNC: 44 UG/DL (ref 80–560)

## 2019-10-08 NOTE — RESULT ENCOUNTER NOTE
Dear Paco,    Here is a summary of your recent test results:  -All of your labs are normal - DHEA is slight low.     For additional lab test information, labtestsonline.org is an excellent reference.    In addition, here is a list of due or overdue Health Maintenance reminders:  Preventive Care Visit due   Kidney Microalbumin Urine Test due on 12/05/2018  Cholesterol Lab due on 07/17/2019      Please call us at [phone] (or use [employer]) to address the above recommendations if needed.           Thank you very much for trusting me and [facility].     Healthy regards,  José Miguel Gaviria MD

## 2019-10-17 ENCOUNTER — MYC MEDICAL ADVICE (OUTPATIENT)
Dept: FAMILY MEDICINE | Facility: CLINIC | Age: 54
End: 2019-10-17

## 2019-10-17 DIAGNOSIS — E03.9 HYPOTHYROIDISM, UNSPECIFIED TYPE: ICD-10-CM

## 2019-10-19 RX ORDER — LEVOTHYROXINE SODIUM 200 UG/1
200 TABLET ORAL DAILY
Qty: 90 TABLET | Refills: 1 | Status: SHIPPED | OUTPATIENT
Start: 2019-10-19 | End: 2020-03-17

## 2019-10-28 ENCOUNTER — ALLIED HEALTH/NURSE VISIT (OUTPATIENT)
Dept: NURSING | Facility: CLINIC | Age: 54
End: 2019-10-28
Payer: COMMERCIAL

## 2019-10-28 DIAGNOSIS — Z23 NEED FOR PROPHYLACTIC VACCINATION AND INOCULATION AGAINST INFLUENZA: Primary | ICD-10-CM

## 2019-10-28 PROCEDURE — 90471 IMMUNIZATION ADMIN: CPT

## 2019-10-28 PROCEDURE — 90686 IIV4 VACC NO PRSV 0.5 ML IM: CPT

## 2019-12-10 ENCOUNTER — MYC REFILL (OUTPATIENT)
Dept: FAMILY MEDICINE | Facility: CLINIC | Age: 54
End: 2019-12-10

## 2019-12-10 DIAGNOSIS — L71.9 ROSACEA: ICD-10-CM

## 2019-12-10 DIAGNOSIS — L70.9 ACNE, UNSPECIFIED ACNE TYPE: ICD-10-CM

## 2019-12-10 DIAGNOSIS — F41.1 GENERALIZED ANXIETY DISORDER: ICD-10-CM

## 2019-12-10 NOTE — TELEPHONE ENCOUNTER
"Requested Prescriptions   Pending Prescriptions Disp Refills     minocycline (MINOCIN/DYNACIN) 100 MG capsule        Last Written Prescription Date:  6.11.19  Last Fill Quantity: 60 capsule,  # refills: 2   Last office visit: 6/11/2019 with prescribing provider:  Sonu Gaviria MD           Future Office Visit:       60 capsule 2     Sig: Take 1 capsule (100 mg) by mouth daily       Oral Acne/Rosacea Medications Protocol Failed - 12/10/2019 10:55 AM        Failed - Confirmation of diagnosis is required     Please confirm diagnosis is acne or rosacea.     If NOT acne or rosacea; refer request to provider for further evaluation.    If diagnosis IS acne or rosacea, OK to refill BASED ON PREVIOUS REFILL CLINICAL NOTE RECOMMENDATION.          Passed - Patient is 12 years of age or older        Passed - Recent (12 mo) or future (30 days) visit within the authorizing provider's specialty     Patient has had an office visit with the authorizing provider or a provider within the authorizing providers department within the previous 12 mos or has a future within next 30 days. See \"Patient Info\" tab in inbasket, or \"Choose Columns\" in Meds & Orders section of the refill encounter.              Passed - Medication is active on med list        propranolol (INDERAL) 40 MG tablet        Last Written Prescription Date:  6.11.19  Last Fill Quantity: 30 tablet,  # refills: 3   Last office visit: 6/11/2019 with prescribing provider:  Sonu Gaviria MD           Future Office Visit:       30 tablet 3     Sig: Take 1 tablet (40 mg) by mouth 2 times daily as needed (situational anxiety)       Beta-Blockers Protocol Passed - 12/10/2019 10:55 AM        Passed - Blood pressure under 140/90 in past 12 months     BP Readings from Last 3 Encounters:   06/11/19 124/80   02/26/19 (!) 147/95   02/12/19 124/80                 Passed - Patient is age 6 or older        Passed - Recent (12 mo) or future (30 days) visit within the authorizing " "provider's specialty     Patient has had an office visit with the authorizing provider or a provider within the authorizing providers department within the previous 12 mos or has a future within next 30 days. See \"Patient Info\" tab in inbasket, or \"Choose Columns\" in Meds & Orders section of the refill encounter.              Passed - Medication is active on med list        "

## 2019-12-16 RX ORDER — PROPRANOLOL HYDROCHLORIDE 40 MG/1
40 TABLET ORAL 2 TIMES DAILY PRN
Qty: 30 TABLET | Refills: 0 | Status: SHIPPED | OUTPATIENT
Start: 2019-12-16 | End: 2020-12-07

## 2019-12-16 RX ORDER — MINOCYCLINE HYDROCHLORIDE 100 MG/1
100 CAPSULE ORAL DAILY
Qty: 60 CAPSULE | Refills: 0 | Status: SHIPPED | OUTPATIENT
Start: 2019-12-16 | End: 2020-06-02

## 2019-12-16 NOTE — TELEPHONE ENCOUNTER
Patient traveling, will call back or schedule on my chart when back in town.       Lalitha Valdes

## 2019-12-16 NOTE — TELEPHONE ENCOUNTER
Patient due for fasting office visit- 30 days supply given.  Routing to team to schedule appointment     Lalitha JACQUES RN  [facility]  [phone]

## 2019-12-26 ENCOUNTER — MYC MEDICAL ADVICE (OUTPATIENT)
Dept: FAMILY MEDICINE | Facility: CLINIC | Age: 54
End: 2019-12-26

## 2019-12-26 DIAGNOSIS — R79.89 LOW SERUM CORTISOL LEVEL: ICD-10-CM

## 2019-12-26 DIAGNOSIS — E03.9 HYPOTHYROIDISM, UNSPECIFIED TYPE: ICD-10-CM

## 2019-12-26 DIAGNOSIS — R53.83 FATIGUE, UNSPECIFIED TYPE: Primary | ICD-10-CM

## 2019-12-26 DIAGNOSIS — Z13.220 SCREENING FOR LIPID DISORDERS: ICD-10-CM

## 2019-12-26 NOTE — TELEPHONE ENCOUNTER
10/04/2019 Result note:   -All of your labs are normal - DHEA is slight low.    Routing to PCP for further review/recommendations/orders.  Please advise on lab request    Leigh Lee RN  [facility]

## 2020-01-03 ENCOUNTER — TRANSFERRED RECORDS (OUTPATIENT)
Dept: HEALTH INFORMATION MANAGEMENT | Facility: CLINIC | Age: 55
End: 2020-01-03

## 2020-01-10 DIAGNOSIS — Z13.220 SCREENING FOR LIPID DISORDERS: ICD-10-CM

## 2020-01-10 DIAGNOSIS — R79.89 LOW SERUM CORTISOL LEVEL: ICD-10-CM

## 2020-01-10 DIAGNOSIS — R53.83 FATIGUE, UNSPECIFIED TYPE: ICD-10-CM

## 2020-01-10 DIAGNOSIS — E03.9 HYPOTHYROIDISM, UNSPECIFIED TYPE: ICD-10-CM

## 2020-01-10 LAB
CHOLEST SERPL-MCNC: 187 MG/DL
CORTIS SERPL-MCNC: 9.7 UG/DL (ref 4–22)
HDLC SERPL-MCNC: 39 MG/DL
LDLC SERPL CALC-MCNC: 117 MG/DL
NONHDLC SERPL-MCNC: 148 MG/DL
T3FREE SERPL-MCNC: 2.8 PG/ML (ref 2.3–4.2)
T4 FREE SERPL-MCNC: 1.27 NG/DL (ref 0.76–1.46)
TRIGL SERPL-MCNC: 156 MG/DL
TSH SERPL DL<=0.005 MIU/L-ACNC: 0.14 MU/L (ref 0.4–4)

## 2020-01-10 PROCEDURE — 82627 DEHYDROEPIANDROSTERONE: CPT | Performed by: FAMILY MEDICINE

## 2020-01-10 PROCEDURE — 82533 TOTAL CORTISOL: CPT | Performed by: FAMILY MEDICINE

## 2020-01-10 PROCEDURE — 84481 FREE ASSAY (FT-3): CPT | Performed by: FAMILY MEDICINE

## 2020-01-10 PROCEDURE — 80061 LIPID PANEL: CPT | Performed by: FAMILY MEDICINE

## 2020-01-10 PROCEDURE — 36415 COLL VENOUS BLD VENIPUNCTURE: CPT | Performed by: FAMILY MEDICINE

## 2020-01-10 PROCEDURE — 84439 ASSAY OF FREE THYROXINE: CPT | Performed by: FAMILY MEDICINE

## 2020-01-10 PROCEDURE — 84443 ASSAY THYROID STIM HORMONE: CPT | Performed by: FAMILY MEDICINE

## 2020-01-13 LAB — DHEA-S SERPL-MCNC: 46 UG/DL (ref 80–560)

## 2020-01-14 NOTE — RESULT ENCOUNTER NOTE
Dear Paco,    Here is a summary of your recent test results:  -Cholesterol levels (LDL,HDL, Triglycerides) are okay.  ADVISE: rechecking  in 1 year.  -6% of patients that have a similar profile to you will have a stroke, heart attack or death (related to heart disease) within the next 10 years and that is considered a low risk and cholesterol lowering medications are not recommended at this time. (high risk is >10%, or >7.5% if other risk factors such has high blood pressure or other heart disease risk factors)  The ASCVD risk score (Willy MILLER Jr et al., 2013) returns the percentage likelihood of a first time Atherosclerotic Cardiovascular Disease (ASCVD) event.    The 10-year ASCVD risk score (Willy MILLER Jr., et al., 2013) is: 6%    Values used to calculate the score:      Age: 54 years      Sex: Male      Is Non- : No      Diabetic: No      Tobacco smoker: No      Systolic Blood Pressure: 124 mmHg      Is BP treated: Yes      HDL Cholesterol: 39 mg/dL      Total Cholesterol: 187 mg/dL    -TSH (thyroid stimulating hormone) level is near normal which indicates appropriate thyroid replacement dosing.  ADVISE: continuing same replacement dose and rechecking this in 6 months.  Other labs are okay.    For additional lab test information, labtestsonline.org is an excellent reference.    In addition, here is a list of due or overdue Health Maintenance reminders:  Preventive Care Visit due   Kidney Microalbumin Urine Test due on 12/05/2018  Basic Metabolic Panel due on 02/01/2020    Please call us at [phone] (or use [employer]) to address the above recommendations if needed.           Thank you very much for trusting me and [facility].     Healthy regards,  José Miguel Gaviria MD

## 2020-05-13 ENCOUNTER — MYC MEDICAL ADVICE (OUTPATIENT)
Dept: FAMILY MEDICINE | Facility: CLINIC | Age: 55
End: 2020-05-13

## 2020-05-13 DIAGNOSIS — R53.83 FATIGUE, UNSPECIFIED TYPE: Primary | ICD-10-CM

## 2020-05-13 NOTE — TELEPHONE ENCOUNTER
LOV 6/11/2019      Please see my chart message below     Please review and advise     Thank you     Stacy Tipton RN, BSN  [facility]

## 2020-05-19 DIAGNOSIS — R53.83 FATIGUE, UNSPECIFIED TYPE: ICD-10-CM

## 2020-05-19 LAB
CORTIS SERPL-MCNC: 7 UG/DL (ref 4–22)
T3FREE SERPL-MCNC: 3.3 PG/ML (ref 2.3–4.2)

## 2020-05-19 PROCEDURE — 84439 ASSAY OF FREE THYROXINE: CPT | Performed by: FAMILY MEDICINE

## 2020-05-19 PROCEDURE — 82627 DEHYDROEPIANDROSTERONE: CPT | Performed by: FAMILY MEDICINE

## 2020-05-19 PROCEDURE — 82533 TOTAL CORTISOL: CPT | Performed by: FAMILY MEDICINE

## 2020-05-19 PROCEDURE — 36415 COLL VENOUS BLD VENIPUNCTURE: CPT | Performed by: FAMILY MEDICINE

## 2020-05-19 PROCEDURE — 84270 ASSAY OF SEX HORMONE GLOBUL: CPT | Performed by: FAMILY MEDICINE

## 2020-05-19 PROCEDURE — 80048 BASIC METABOLIC PNL TOTAL CA: CPT | Performed by: FAMILY MEDICINE

## 2020-05-19 PROCEDURE — 84403 ASSAY OF TOTAL TESTOSTERONE: CPT | Performed by: FAMILY MEDICINE

## 2020-05-19 PROCEDURE — 84481 FREE ASSAY (FT-3): CPT | Performed by: FAMILY MEDICINE

## 2020-05-20 LAB
ANION GAP SERPL CALCULATED.3IONS-SCNC: 9 MMOL/L (ref 3–14)
BUN SERPL-MCNC: 15 MG/DL (ref 7–30)
CALCIUM SERPL-MCNC: 9.2 MG/DL (ref 8.5–10.1)
CHLORIDE SERPL-SCNC: 106 MMOL/L (ref 94–109)
CO2 SERPL-SCNC: 22 MMOL/L (ref 20–32)
CREAT SERPL-MCNC: 0.92 MG/DL (ref 0.66–1.25)
DHEA-S SERPL-MCNC: 45 UG/DL (ref 80–560)
GFR SERPL CREATININE-BSD FRML MDRD: >90 ML/MIN/{1.73_M2}
GLUCOSE SERPL-MCNC: 94 MG/DL (ref 70–99)
POTASSIUM SERPL-SCNC: 4.2 MMOL/L (ref 3.4–5.3)
SODIUM SERPL-SCNC: 137 MMOL/L (ref 133–144)
T4 FREE SERPL-MCNC: 1.42 NG/DL (ref 0.76–1.46)

## 2020-05-21 LAB
SHBG SERPL-SCNC: 35 NMOL/L (ref 11–80)
TESTOST FREE SERPL-MCNC: 11.77 NG/DL (ref 4.7–24.4)
TESTOST SERPL-MCNC: 569 NG/DL (ref 240–950)

## 2020-05-21 NOTE — RESULT ENCOUNTER NOTE
Dear Paco,    Here is a summary of your recent test results:  -All of your labs are normal besides your DHEA             Thank you very much for trusting me and [facility].     Healthy regards,  José Miguel Gaviria MD

## 2020-05-31 ENCOUNTER — TRANSFERRED RECORDS (OUTPATIENT)
Dept: HEALTH INFORMATION MANAGEMENT | Facility: CLINIC | Age: 55
End: 2020-05-31

## 2020-06-01 ENCOUNTER — MYC MEDICAL ADVICE (OUTPATIENT)
Dept: FAMILY MEDICINE | Facility: CLINIC | Age: 55
End: 2020-06-01

## 2020-06-01 NOTE — TELEPHONE ENCOUNTER
Please see my chart message below     Please review and advise     Thank you     Stacy Tipton RN, BSN  [facility]

## 2020-06-01 NOTE — PROGRESS NOTES
"Subjective   Paco Fatima is a 55 year old male who is being evaluated via a billable telephone visit.      The patient has been notified of following:     \"This telephone visit will be conducted via a call between you and your physician/provider. We have found that certain health care needs can be provided without the need for a physical exam.  This service lets us provide the care you need with a short phone conversation.  If a prescription is necessary we can send it directly to your pharmacy.  If lab work is needed we can place an order for that and you can then stop by our lab to have the test done at a later time.    Telephone visits are billed at different rates depending on your insurance coverage. During this emergency period, for some insurers they may be billed the same as an in-person visit.  Please reach out to your insurance provider with any questions.    If during the course of the call the physician/provider feels a telephone visit is not appropriate, you will not be charged for this service.\"     Patient has given verbal consent for Telephone visit?  Yes    How would you like to obtain your AVS? MyChart    Telephone visit Start Time:     Paco Fatima is a 55 year old male who presents today for the following health issues:    Chief Complaint  Patient presents with:  Arthritis       Joint Pain    Onset: 1 week seen in  05/31/2020 given Colchicine - on day 3    Description:   Location: left wrist and left knee see mychart pictures  Character: Sharp    Intensity: severe    Progression of Symptoms: worse    Accompanying Signs & Symptoms:  Other symptoms: none    History:   Previous similar pain: YES      Precipitating factors:   Trauma or overuse: no     Alleviating factors:  Improved by: ice    Therapies Tried and outcome: ibuprofen 800 three 2-3 times per day -, colchicine helps some.     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    Reviewed and " updated as needed this visit by Provider  Tobacco  Allergies  Meds  Problems  Med Hx  Surg Hx  Fam Hx         ROS: Constitutional, HEENT, cardiovascular, pulmonary, GI, , musculoskeletal, neuro, skin, endocrine and psych systems are negative, except as otherwise noted.       Objective   Reported vitals:  There were no vitals taken for this visit.   Gen: healthy, alert, and no distress  Psych: Alert and oriented times 3; coherent speech, normal   rate and volume, able to articulate logical thoughts, able   to abstract reason, no tangential thoughts, no hallucinations   or delusions.  His affect is normal.    Diagnostic Test Results:  Labs reviewed in Epic        Assessment/Plan:  Paco was seen today for arthritis.    Diagnoses and all orders for this visit:    Inflammatory arthropathy -minimally improving with colchicine thus far and will add indomethacin (hold ibuprofen)-take with food and reduce dose when able  -     indomethacin (INDOCIN) 25 MG capsule; Take 1-2 capsules (25-50 mg) by mouth 3 times daily as needed for moderate pain    Low serum cortisol level (H) -feeling okay overall we will check labs in future  -     Cortisol; Future  -     DHEA sulfate; Future    Hypothyroidism, unspecified type recent labs okay will recheck in 3 months  -     TSH; Future  -     T3, Free; Future  -     T4, free; Future        Return in about 3 months (around 9/2/2020) for Lab only appointment.    Telephone visit Stop Time:   Phone call duration:  11 minutes        José Miguel Gaviria MD     [address] [address]  [email or web address]  [email or web address]   Office: [phone]  Pager: [phone]

## 2020-06-02 ENCOUNTER — VIRTUAL VISIT (OUTPATIENT)
Dept: FAMILY MEDICINE | Facility: CLINIC | Age: 55
End: 2020-06-02
Payer: COMMERCIAL

## 2020-06-02 DIAGNOSIS — M19.90 INFLAMMATORY ARTHROPATHY: Primary | ICD-10-CM

## 2020-06-02 DIAGNOSIS — E03.9 HYPOTHYROIDISM, UNSPECIFIED TYPE: ICD-10-CM

## 2020-06-02 DIAGNOSIS — R79.89 LOW SERUM CORTISOL LEVEL: ICD-10-CM

## 2020-06-02 PROCEDURE — 99214 OFFICE O/P EST MOD 30 MIN: CPT | Mod: 95 | Performed by: FAMILY MEDICINE

## 2020-06-02 RX ORDER — INDOMETHACIN 25 MG/1
25-50 CAPSULE ORAL 3 TIMES DAILY PRN
Qty: 90 CAPSULE | Refills: 1 | Status: SHIPPED | OUTPATIENT
Start: 2020-06-02 | End: 2020-11-09

## 2020-06-02 RX ORDER — COLCHICINE 0.6 MG/1
1.2 TABLET ORAL
COMMUNITY
Start: 2020-05-31 | End: 2020-11-09

## 2020-06-03 ENCOUNTER — TRANSFERRED RECORDS (OUTPATIENT)
Dept: HEALTH INFORMATION MANAGEMENT | Facility: CLINIC | Age: 55
End: 2020-06-03

## 2020-06-03 ENCOUNTER — HOSPITAL LABORATORY (OUTPATIENT)
Dept: OTHER | Facility: CLINIC | Age: 55
End: 2020-06-03

## 2020-06-05 LAB
APPEARANCE FLD: NORMAL
COLOR FLD: NORMAL
SPECIMEN SOURCE FLD: NORMAL
WBC # FLD AUTO: NORMAL /UL

## 2020-06-09 ENCOUNTER — MYC MEDICAL ADVICE (OUTPATIENT)
Dept: FAMILY MEDICINE | Facility: CLINIC | Age: 55
End: 2020-06-09

## 2020-06-09 DIAGNOSIS — E03.9 HYPOTHYROIDISM, UNSPECIFIED TYPE: Primary | ICD-10-CM

## 2020-06-09 RX ORDER — LEVOTHYROXINE SODIUM 175 UG/1
175 TABLET ORAL DAILY
Qty: 90 TABLET | Refills: 1 | Status: SHIPPED | OUTPATIENT
Start: 2020-06-09 | End: 2020-12-07

## 2020-06-09 NOTE — TELEPHONE ENCOUNTER
Please see my chart message below     Please review and advise     Thank you     Stacy Tipton RN, BSN  [facility]

## 2020-06-13 ENCOUNTER — OFFICE VISIT (OUTPATIENT)
Dept: URGENT CARE | Facility: URGENT CARE | Age: 55
End: 2020-06-13
Payer: COMMERCIAL

## 2020-06-13 VITALS
BODY MASS INDEX: 30.09 KG/M2 | OXYGEN SATURATION: 97 % | WEIGHT: 228.1 LBS | HEART RATE: 70 BPM | SYSTOLIC BLOOD PRESSURE: 120 MMHG | DIASTOLIC BLOOD PRESSURE: 90 MMHG | TEMPERATURE: 97.5 F | RESPIRATION RATE: 16 BRPM

## 2020-06-13 DIAGNOSIS — M19.90 INFLAMMATORY ARTHROPATHY: Primary | ICD-10-CM

## 2020-06-13 PROCEDURE — 99214 OFFICE O/P EST MOD 30 MIN: CPT | Performed by: PHYSICIAN ASSISTANT

## 2020-06-13 RX ORDER — PREDNISONE 10 MG/1
TABLET ORAL
Qty: 18 TABLET | Refills: 0 | Status: SHIPPED | OUTPATIENT
Start: 2020-06-13 | End: 2020-06-13

## 2020-06-13 ASSESSMENT — ENCOUNTER SYMPTOMS
DIARRHEA: 0
NAUSEA: 0
CHILLS: 0
ABDOMINAL PAIN: 0
FOCAL WEAKNESS: 0
FEVER: 0
VOMITING: 0
HEADACHES: 0
SHORTNESS OF BREATH: 0

## 2020-06-13 NOTE — PATIENT INSTRUCTIONS
Take the Medrol you have at home. Instructions below for dose pack:  Day 1: 24 mg on day 1 administered as 8 mg (2 tablets) before breakfast, 4 mg (1 tablet) after lunch, 4 mg (1 tablet) after supper, and 8 mg (2 tablets) at bedtime or 24 mg (6 tablets) as a single dose or divided into 2 or 3 doses upon initiation (regardless of time of day).  Day 2: 20 mg on day 2 administered as 4 mg (1 tablet) before breakfast, 4 mg (1 tablet) after lunch, 4 mg (1 tablet) after supper, and 8 mg (2 tablets) at bedtime.  Day 3: 16 mg on day 3 administered as 4 mg (1 tablet) before breakfast, 4 mg (1 tablet) after lunch, 4 mg (1 tablet) after supper, and 4 mg (1 tablet) at bedtime.  Day 4: 12 mg on day 4 administered as 4 mg (1 tablet) before breakfast, 4 mg (1 tablet) after lunch, and 4 mg (1 tablet) at bedtime.  Day 5: 8 mg on day 5 administered as 4 mg (1 tablet) before breakfast and 4 mg (1 tablet) at bedtime.  Day 6: 4 mg on day 6 administered as 4 mg (1 tablet) before breakfast.    Follow up with rheumatology next week.  While taking the Medrol, do not take ibuprofen/Advil.

## 2020-06-13 NOTE — PROGRESS NOTES
HPI    June 13, 2020    HPI: Paco Fatima is a 55 year old male who complains of moderate L wrist pain & swelling onset 3 weeks ago. He denies injury or trauma. He has issues with joint swelling/pain over the last few years, primarily in his L wrist and L knee but also in the R wrist and shoulders occasionally. He has been seen by a rheumatologist in the past for this and symptoms are felt to be due to pseudogout but this has not been able to be confirmed by joint aspiration. He has had issues in the past with reduced cortisol levels/adrenals suppression when taking oral corticosteroids so has been told to try and avoid them. About 1 week after symptoms began he went to the urgency room and was started on colchicine, but this did not help. He then consulted with his PCP on 6/2 who advised starting indomethacin but this upset his stomach so he stopped taking it. He also went to [facility] and had a cortisone injection which did help reduce swelling but only helped temporarily with the pain. He is now taking ibuprofen without relief & is having a difficult time sleeping. Symptoms are constant in duration. Denies fever/chills, HA, CP, SOB, abd pain, N/V/D, rash, or any other symptoms. Patient denies sick contacts.    He does have a f/u appt with rheumatology on 6/17.      Past Medical History:   Diagnosis Date     Essential hypertension with goal blood pressure less than 140/90 7/19/2016     DAY (generalized anxiety disorder)      GERD (gastroesophageal reflux disease)      h/o Subclinical hypothyroidism 2/12/2013     Other fatigue 3/21/2016     Past Surgical History:   Procedure Laterality Date     COLONOSCOPY N/A 9/18/2018    Procedure: COLONOSCOPY;;  Surgeon: Noé Sharma MD;  Location:  GI     ESOPHAGOSCOPY, GASTROSCOPY, DUODENOSCOPY (EGD), COMBINED N/A 9/18/2018    Procedure: COMBINED ESOPHAGOSCOPY, GASTROSCOPY, DUODENOSCOPY (EGD);  ESOPHAGOSCOPY, GASTROSCOPY, DUODENOSCOPY (EGD) and COLONOSCOPY (FV)  ;   Surgeon: Noé Sharma MD;  Location: [facility]     HC REMOVAL OF TONSILS,<11 Y/O      Tonsillectomy, under 12 yrs     HC REPAIR RECURR INGUIN DMITRY,REDUCIBL  1987    Hernia, inguinal     SURGICAL HISTORY OF -       deviated septum repair     Social History     Tobacco Use     Smoking status: Never Smoker     Smokeless tobacco: Never Used   Substance Use Topics     Alcohol use: Yes     Comment: 3 beers weekly     Drug use: No     Patient Active Problem List   Diagnosis     Recurrent Folliculitis     Perioral Dermatitis     Generalized anxiety disorder     Family history of diabetes mellitus     Fasciculations of muscle     Hypotestosteronism     Hyperlipidemia LDL goal <160     Adult ADHD     Other fatigue     Hypothyroidism, unspecified type     Essential hypertension with goal blood pressure less than 140/90     Family History   Problem Relation Age of Onset     C.A.D. Maternal Grandmother      C.A.D. Maternal Grandfather      Cancer Father          age 30-Sarcoma     Other Cancer Father      Psychotic Disorder Mother         anxiety     Diabetes Mother         DM-1     Thyroid Disease Mother      Musculoskeletal Disorder Sister 38        MS     Thyroid Disease Sister      Diabetes Sister         DM-1     Lipids No family hx of      Hypertension No family hx of      Prostate Cancer No family hx of      Colon Cancer No family hx of         Problem list, Medication list, Allergies, and Medical/Social/Surgical histories reviewed in [facility] and updated as appropriate.    Review of Systems   Constitutional: Negative for chills and fever.   Respiratory: Negative for shortness of breath.    Cardiovascular: Negative for chest pain.   Gastrointestinal: Negative for abdominal pain, diarrhea, nausea and vomiting.   Musculoskeletal: Positive for joint pain (and swelling).   Skin: Negative for rash.   Neurological: Negative for focal weakness and headaches.   All other systems reviewed and are negative.        Physical  Exam  Vitals signs and nursing note reviewed.   HENT:      Head: Normocephalic and atraumatic.   Cardiovascular:      Rate and Rhythm: Normal rate and regular rhythm.      Pulses:           Radial pulses are 2+ on the left side.      Heart sounds: Normal heart sounds.   Pulmonary:      Effort: Pulmonary effort is normal.      Breath sounds: Normal breath sounds.   Musculoskeletal:      Left wrist: He exhibits decreased range of motion (due to pain), tenderness and swelling.      Comments: Diffuse swelling/tenderness to L dorsal wrist. Mild erythema. No streaking, induration, or fluctuance   Skin:     General: Skin is warm and dry.      Findings: Erythema present.   Neurological:      Mental Status: He is alert and oriented to person, place, and time.       Vital Signs  BP (!) 120/90 (BP Location: Right arm, Patient Position: Sitting, Cuff Size: Adult Large)   Pulse 70   Temp 97.5  F (36.4  C) (Tympanic)   Resp 16   Wt 103.5 kg (228 lb 1.6 oz)   SpO2 97%   BMI 30.09 kg/m       Diagnostic Test Results:  none     ASSESSMENT/PLAN      ICD-10-CM    1. Inflammatory arthropathy  M19.90 DISCONTINUED: predniSONE (DELTASONE) 10 MG tablet      I do not suspect infectious etiology as pt had some relief with cortisone injection & pt has had similar issues in the past. I offered Rx for opioid for pain relief until pt can see rheumatology but he has a hx of opioid abuse so he declines this. Discussed that I would advise avoiding PO steroids due to hx of adrenal suppression but pt would like to try this; he is aware of the risk. He has methylprednisolone at home and will do a Medrol dose pack at home- instructions given. He declined a Rx for a new Medrol dose pack.      I have discussed any lab or imaging results, the patient's diagnosis, and my plan of treatment with the patient and/or family. Patient is aware to come back in if with worsening symptoms or if no relief despite treatment plan.  Patient voiced understanding and  had no further questions.       Follow Up: Return in about 4 days (around 6/17/2020) for Follow up with rheumatology.    JACKELYN Coy, PA-C  [facility]

## 2020-06-19 ENCOUNTER — MYC MEDICAL ADVICE (OUTPATIENT)
Dept: FAMILY MEDICINE | Facility: CLINIC | Age: 55
End: 2020-06-19

## 2020-06-24 ENCOUNTER — MYC MEDICAL ADVICE (OUTPATIENT)
Dept: FAMILY MEDICINE | Facility: CLINIC | Age: 55
End: 2020-06-24

## 2020-06-24 NOTE — TELEPHONE ENCOUNTER
Patient has viewed Tro[employer] Message    Attempt #3  Trooval Message sent    Leigh Lee RN  [facility]  [place]

## 2020-07-09 ENCOUNTER — MYC MEDICAL ADVICE (OUTPATIENT)
Dept: FAMILY MEDICINE | Facility: CLINIC | Age: 55
End: 2020-07-09

## 2020-07-09 NOTE — TELEPHONE ENCOUNTER
EMY was faxed to HIM on 6/29/20  [employer] Message sent      Leigh Lee RN  [facility]

## 2020-08-31 DIAGNOSIS — R79.89 LOW SERUM CORTISOL LEVEL: ICD-10-CM

## 2020-08-31 DIAGNOSIS — E03.9 HYPOTHYROIDISM, UNSPECIFIED TYPE: ICD-10-CM

## 2020-08-31 LAB
CORTIS SERPL-MCNC: 8.2 UG/DL (ref 4–22)
DHEA-S SERPL-MCNC: 42 UG/DL (ref 80–560)
T3FREE SERPL-MCNC: 2.6 PG/ML (ref 2.3–4.2)
T4 FREE SERPL-MCNC: 1.01 NG/DL (ref 0.76–1.46)
TSH SERPL DL<=0.005 MIU/L-ACNC: 1.22 MU/L (ref 0.4–4)

## 2020-08-31 PROCEDURE — 36415 COLL VENOUS BLD VENIPUNCTURE: CPT | Performed by: FAMILY MEDICINE

## 2020-08-31 PROCEDURE — 84439 ASSAY OF FREE THYROXINE: CPT | Performed by: FAMILY MEDICINE

## 2020-08-31 PROCEDURE — 84443 ASSAY THYROID STIM HORMONE: CPT | Performed by: FAMILY MEDICINE

## 2020-08-31 PROCEDURE — 82627 DEHYDROEPIANDROSTERONE: CPT | Performed by: FAMILY MEDICINE

## 2020-08-31 PROCEDURE — 82533 TOTAL CORTISOL: CPT | Performed by: FAMILY MEDICINE

## 2020-08-31 PROCEDURE — 84481 FREE ASSAY (FT-3): CPT | Performed by: FAMILY MEDICINE

## 2020-09-01 NOTE — RESULT ENCOUNTER NOTE
Dear Paco,    Here is a summary of your recent test results:  -TSH (thyroid stimulating hormone) level is normal which indicates appropriate thyroid replacement dosing.  ADVISE: continuing same replacement dose and rechecking this in 6 months.  -Normal morning cortisol level.      For additional lab test information, labtestsonline.org is an excellent reference.    In addition, here is a list of due or overdue Health Maintenance reminders:  Preventive Care Visit due   Zoster (Shingles) Vaccine(1 of 2) due on 01/19/2016  Kidney Microalbumin Urine Test due on 12/05/2018  Flu Vaccine(1) due on 09/01/2020    Please call us at [phone] (or use [employer]) to address the above recommendations if needed.           Thank you very much for trusting me and [facility].     Have a peaceful day.    Healthy regards,  José Miguel Gaviria MD

## 2020-09-02 ENCOUNTER — VIRTUAL VISIT (OUTPATIENT)
Dept: FAMILY MEDICINE | Facility: OTHER | Age: 55
End: 2020-09-02

## 2020-09-03 DIAGNOSIS — Z20.822 SUSPECTED COVID-19 VIRUS INFECTION: ICD-10-CM

## 2020-09-03 DIAGNOSIS — Z20.822 SUSPECTED COVID-19 VIRUS INFECTION: Primary | ICD-10-CM

## 2020-09-03 PROCEDURE — U0003 INFECTIOUS AGENT DETECTION BY NUCLEIC ACID (DNA OR RNA); SEVERE ACUTE RESPIRATORY SYNDROME CORONAVIRUS 2 (SARS-COV-2) (CORONAVIRUS DISEASE [COVID-19]), AMPLIFIED PROBE TECHNIQUE, MAKING USE OF HIGH THROUGHPUT TECHNOLOGIES AS DESCRIBED BY CMS-2020-01-R: HCPCS | Performed by: FAMILY MEDICINE

## 2020-09-04 LAB
SARS-COV-2 RNA SPEC QL NAA+PROBE: NOT DETECTED
SPECIMEN SOURCE: NORMAL

## 2020-09-04 NOTE — PROGRESS NOTES
"Date: 2020 19:07:47  Clinician: Felicia Daily  Clinician NPI: [id number]  Patient: Paco Fatima  Patient : 1965  Patient Address: [address] [place] [address]  Patient Phone: [phone]  Visit Protocol: URI  Patient Summary:  Paco is a 55 year old ( : 1965 ) male who initiated a Visit for COVID-19 (Coronavirus) evaluation and screening. When asked the question \"Please sign me up to receive news, health information and promotions. \", Paco responded \"No\".    When asked when his symptoms started, Paco reported that he does not have any symptoms.   He denies taking antibiotic medication in the past month and having recent facial or sinus surgery in the past 60 days.    Pertinent COVID-19 (Coronavirus) information  In the past 14 days, Paco has not worked in a congregate living setting.   He does not work or volunteer as healthcare worker or a  and does not work or volunteer in a healthcare facility.   Paco also has not lived in a congregate living setting in the past 14 days. He does not live with a healthcare worker.   Paco has had a close contact with a laboratory-confirmed COVID-19 patient in the last 14 days. Additional information about contact with COVID-19 (Coronavirus) patient as reported by the patient (free text): Living in same house as my daughter who is confirmed as COVID positive    Patient reported they are living in the same household with a COVID-19 positive patient.  Since 2019, Paco and has not had upper respiratory infection or influenza-like illness. Has not been diagnosed with lab-confirmed COVID-19 test   Pertinent medical history  Paco does not need a return to work/school note.   Weight: 225 lbs   Paco does not smoke or use smokeless tobacco.   Weight: 225 lbs    MEDICATIONS: levothyroxine oral, ALLERGIES: NKDA  Clinician Response:  Dear Paco,    Based on your exposure to COVID-19 (coronavirus), we would like to test you " for this virus.  1. Please call [phone] to schedule your visit. Explain that you [facility] have [facility]-19 test. Be ready to share your [facility] visit ID number.  The following will serve as your written order for this COVID Test, ordered by me, for the indication of suspected COVID [Z20.828]: The test will be ordered in [employer], our electronic health record, after you are scheduled. It will show as ordered and authorized by Gray Arreaga MD.  Order: COVID-19 (coronavirus) PCR for ASYMPTOMATIC EXPOSURE testing from [facility].  If you know you have had close contact with someone who tested positive, you should be quarantined for 14 days after this exposure. You should stay in quarantine for the14 days even if the covid test is negative, the optimal time to test after exposure is 5-7 days from the exposure  Quarantine means   What should I do?  For safety, it's very important to follow these rules. Do this for 14 days after the date you were last exposed to the virus..  Stay home and away from others. Don't go to school or anywhere else. Generally quarantine means staying home from work but there are some exceptions to this. Please contact your workplace.   No hugging, kissing or shaking hands.  Don't let anyone visit.  Cover your mouth and nose with a mask, tissue or washcloth to avoid spreading germs.  Wash your hands and face often. Use soap and water.  What are the symptoms of COVID-19?  The most common symptoms are cough, fever and trouble breathing. Less common symptoms include headache, body aches, fatigue (feeling very tired), chills, sore throat, stuffy or runny nose, diarrhea (loose poop), loss of taste or smell, belly pain, and nausea or vomiting (feeling sick to your stomach or throwing up).  After 14 days, if you have still don't have symptoms, you likely don't have this virus.  If you develop symptoms, follow these guidelines.  If you're normally healthy: Please start another [facility] visit to  report your symptoms. Go to [email or web address].  If you have a serious health problem (like cancer, heart failure, an organ transplant or kidney disease): Call your specialty clinic. Let them know that you might have COVID-19.  2. When it's time for your COVID test:  Stay at least 6 feet away from others. (If someone will drive you to your test, stay in the backseat, as far away from the  as you can.)  Cover your mouth and nose with a mask, tissue or washcloth.  Go straight to the testing site. Don't make any stops on the way there or back.  Please note  Caregivers in these groups are at risk for severe illness due to COVID-19:  o People 65 years and older  o People who live in a nursing home or long-term care facility  o People with chronic disease (lung, heart, cancer, diabetes, kidney, liver, immunologic)  o People who have a weakened immune system, including those who:  Are in cancer treatment  Take medicine that weakens the immune system, such as corticosteroids  Had a bone marrow or organ transplant  Have an immune deficiency  Have poorly controlled HIV or AIDS  Are obese (body mass index of 40 or higher)  Smoke regularly  Where can I get more information?  [facility] -- About COVID-19: [email or web address]  CDC -- What to Do If You're Sick: www.cdc.gov/coronavirus/2019-ncov/about/steps-when-sick.html  CDC -- Ending Home Isolation: www.cdc.gov/coronavirus/2019-ncov/hcp/disposition-in-home-patients.html  CDC -- Caring for Someone: www.cdc.gov/coronavirus/2019-ncov/if-you-are-sick/care-for-someone.html  [facility] -- Interim Guidance for Hospital Discharge to Home: www.health.[facility].[place].us/diseases/coronavirus/hcp/hospdischarge.pdf  [facility] clinical trials (COVID-19 research studies): clinicalaffairs.[facility]/umn-clinical-trials  Below are the COVID-19 hotlines at the [place] Department of Health ([facility]). Interpreters are available.  For health questions: Call 257-312-8667 or  1-214.182.3682 (7 a.m. to 7 p.m.)  For questions about schools and childcare: Call 468-600-6291 or 1-895.302.3646 (7 a.m. to 7 p.m.)      Diagnosis: Contact with and (suspected) exposure to other viral communicable diseases  Diagnosis ICD: Z20.828

## 2020-11-08 ENCOUNTER — VIRTUAL VISIT (OUTPATIENT)
Dept: FAMILY MEDICINE | Facility: OTHER | Age: 55
End: 2020-11-08

## 2020-11-08 NOTE — PROGRESS NOTES
"Date: 2020 15:09:59  Clinician: Sandra Zepeda  Clinician NPI: [id number]  Patient: Paco Fatima  Patient : 1965  Patient Address: [address] [place] [address]  Patient Phone: [phone]  Visit Protocol: URI  Patient Summary:  Paco is a 55 year old ( : 1965 ) male who initiated a [facility] Visit for COVID-19 (Coronavirus) evaluation and screening. When asked the question \"Please sign me up to receive news, health information and promotions. \", Paco responded \"Yes\".    Paco states his symptoms started suddenly 3-4 days ago. After his symptoms started, they improved and then got worse again.   His symptoms consist of myalgia, chills, malaise, a sore throat, a headache, a cough, and nausea. Paco also feels feverish but was unable to measure his temperature.   Symptom details     Cough: Paco coughs a few times an hour and his cough is more bothersome at night. Phlegm does not come into his throat when he coughs. He does not believe his cough is caused by post-nasal drip.     Sore throat: Paco reports having mild throat pain (1-3 on a 10 point pain scale), does not have exudate on his tonsils, and can swallow liquids. He is not sure if the lymph nodes in his neck are enlarged. A rash has not appeared on the skin since the sore throat started.     Headache: He states the headache is moderate (4-6 on a 10 point pain scale).      Paco denies having vomiting, rhinitis, facial pain or pressure, teeth pain, ageusia, diarrhea, ear pain, wheezing, nasal congestion, and anosmia. He also denies taking antibiotic medication in the past month, having recent facial or sinus surgery in the past 60 days, and having a sinus infection within the past year. He is not experiencing dyspnea.   Precipitating events  Paco is not sure if he has been exposed to someone with strep throat. He has not recently been exposed to someone with influenza. Paco has not been in close contact with any high risk " individuals.   Pertinent COVID-19 (Coronavirus) information  Paco does not work or volunteer as healthcare worker or a . In the past 14 days, Paco has not worked or volunteered at a healthcare facility or group living setting.   In the past 14 days, he also has not lived in a congregate living setting.   Paco has had a close contact with a laboratory-confirmed COVID-19 patient within 14 days of symptom onset. He was not exposed at his work. He does not know when he was exposed to the laboratory-confirmed COVID-19 patient.   Additional information about contact with COVID-19 (Coronavirus) patient as reported by the patient (free text): Oldest son has been with us and tested positive and is quite sick.  Middle son (also lives with us) has been exposed to several friends who are lab confirmed positive.    Since December 2019, Paco has been tested for COVID-19 and has not had upper respiratory infection or influenza-like illness.      Result of COVID-19 test: Negative     Date of his COVID-19 test: 08/27/2020      Pertinent medical history  Paco does not need a return to work/school note.   Weight: 225 lbs   Paco does not smoke or use smokeless tobacco.   Weight: 225 lbs    MEDICATIONS: levothyroxine oral, ALLERGIES: NKDA  Clinician Response:  Dear Paco,   Your symptoms show that you may have coronavirus (COVID-19). This illness can cause fever, cough and trouble breathing. Many people get a mild case and get better on their own. Some people can get very sick.  What should I do?  We would like to test you for this virus.   1. Please call [phone] to schedule your visit. Explain that you were [facility] COVID-19 [facility]. Be ready to share your [facility] visit ID number.  * If you need to schedule in [facility] please call [phone] or for [employer] employees please call [phone].  * If you need to schedule in the [place] area please call [phone]. [employer] employees call  "[phone].  The following will serve as your written order for this COVID Test, ordered by me, for the indication of suspected COVID [Z20.828]: The test will be ordered in [employer], our electronic health record, after you are scheduled. It will show as ordered and authorized by Gray Arreaga MD.  Order: COVID-19 (Coronavirus) PCR for SYMPTOMATIC testing from [facility].   2. When it's time for your COVID test:  Stay at least 6 feet away from others. (If someone will drive you to your test, stay in the backseat, as far away from the  as you can.)   Cover your mouth and nose with a mask, tissue or washcloth.  Go straight to the testing site. Don't make any stops on the way there or back.      3.Starting now: Stay home and away from others (self-isolate) until:   You've had no fever---and no medicine that reduces fever---for one full day (24 hours). And...   Your other symptoms have gotten better. For example, your cough or breathing has improved. And...   At least 10 days have passed since your symptoms started.       During this time, don't leave the house except for testing or medical care.   Stay in your own room, even for meals. Use your own bathroom if you can.   Stay away from others in your home. No hugging, kissing or shaking hands. No visitors.  Don't go to work, school or anywhere else.    Clean \"high touch\" surfaces often (doorknobs, counters, handles, etc.). Use a household cleaning spray or wipes. You'll find a full list of  on the EPA website: www.epa.gov/pesticide-registration/list-n-disinfectants-use-against-sars-cov-2.   Cover your mouth and nose with a mask, tissue or washcloth to avoid spreading germs.  Wash your hands and face often. Use soap and water.  Caregivers in these groups are at risk for severe illness due to COVID-19:  o People 65 years and older  o People who live in a nursing home or long-term care facility  o People with chronic disease (lung, heart, cancer, diabetes, kidney, " liver, immunologic)  o People who have a weakened immune system, including those who:   Are in cancer treatment  Take medicine that weakens the immune system, such as corticosteroids  Had a bone marrow or organ transplant  Have an immune deficiency  Have poorly controlled HIV or AIDS  Are obese (body mass index of 40 or higher)  Smoke regularly   o Caregivers should wear gloves while washing dishes, handling laundry and cleaning bedrooms and bathrooms.  o Use caution when washing and drying laundry: Don't shake dirty laundry, and use the warmest water setting that you can.  o For more tips, go to www.cdc.gov/coronavirus/2019-ncov/downloads/10Things.pdf.       How can I take care of myself?   Get lots of rest. Drink extra fluids (unless a doctor has told you not to).   Take Tylenol (acetaminophen) for fever or pain. If you have liver or kidney problems, ask your family doctor if it's okay to take Tylenol.   Adults can take either:    650 mg (two 325 mg pills) every 4 to 6 hours, or...   1,000 mg (two 500 mg pills) every 8 hours as needed.    Note: Don't take more than 3,000 mg in one day. Acetaminophen is found in many medicines (both prescribed and over-the-counter medicines). Read all labels to be sure you don't take too much.   For children, check the Tylenol bottle for the right dose. The dose is based on the child's age or weight.    If you have other health problems (like cancer, heart failure, an organ transplant or severe kidney disease): Call your specialty clinic if you don't feel better in the next 2 days.       Know when to call 911. Emergency warning signs include:    Trouble breathing or shortness of breath Pain or pressure in the chest that doesn't go away Feeling confused like you haven't felt before, or not being able to wake up Bluish-colored lips or face.  Where can I get more information?    [facility] -- About COVID-19: [email or web address]   CDC -- What to Do If You're Sick:  www.cdc.gov/coronavirus/2019-ncov/about/steps-when-sick.html   CDC -- Ending Home Isolation: www.cdc.gov/coronavirus/2019-ncov/hcp/disposition-in-home-patients.html   [facility] -- Caring for Someone: www.cdc.gov/coronavirus/2019-ncov/if-you-are-sick/care-for-someone.html   [facility] -- Interim Guidance for Hospital Discharge to Home: www.[facility] access hospital.[place]./diseases/coronavirus/hcp/hospdischarge.pdf   [facility] clinical trials (COVID-19 research studies): clinicalaffairs.[facility]-clinical-trials    Below are the COVID-19 hotlines at the [place] Department of Health ([facility]). Interpreters are available.    For health questions: Call 883-430-2004 or 1-355.284.8711 (7 a.m. to 7 p.m.) For questions about schools and childcare: Call 819-859-0798 or 1-465.573.4210 (7 a.m. to 7 p.m.)    Diagnosis: Contact with and (suspected) exposure to other viral communicable diseases  Diagnosis ICD: Z20.828

## 2020-11-09 ENCOUNTER — VIRTUAL VISIT (OUTPATIENT)
Dept: FAMILY MEDICINE | Facility: CLINIC | Age: 55
End: 2020-11-09
Payer: COMMERCIAL

## 2020-11-09 DIAGNOSIS — R50.9 FEVER AND CHILLS: ICD-10-CM

## 2020-11-09 DIAGNOSIS — J02.9 SORE THROAT: Primary | ICD-10-CM

## 2020-11-09 PROCEDURE — 99213 OFFICE O/P EST LOW 20 MIN: CPT | Mod: 95 | Performed by: FAMILY MEDICINE

## 2020-11-09 NOTE — PROGRESS NOTES
Assessment & Plan   Fever and chills / Sore throat  5 days of symptoms, unclear cause possible Covid exposure in family as well as exposure to strep so therefore we will add on strep test when you get screened later this week for Covid.  Isolation recommended symptomatic cares discussed  - Streptococcus A Rapid Scr w Reflx to PCR      Return in about 1 week (around 11/16/2020) for symptoms failing to improve or sooner if worsening.      José Miguel Gaviria MD      [address] [address]  [email or web address]   Office: [phone]  Pager: [phone]       Franco Fatima is a 55 year old male who is being evaluated via a billable telephone visit.      Patient has given verbal consent for Telephone visit?  Yes    How would you like to obtain your AVS? MyChart    Paco Fatima is a 55 year old male who presents today for the following health issues:    Chief Complaint  Patient presents with:  Fever       Acute Illness  Acute illness concerns: fever headaches  Onset/Duration: 5 days   Symptoms:  Fever: YES  Chills/Sweats: YES  Headache (location?): YES  Sinus Pressure: no  Conjunctivitis:  no  Ear Pain: no  Rhinorrhea: no  Congestion: no  Sore Throat: YES- slight  Cough: YES  Wheeze: no  Decreased Appetite: no  Nausea: no  Vomiting: no  Diarrhea: no  Dysuria/Freq.: no  Dysuria or Hematuria: no  Fatigue/Achiness: YES  Sick/Strep Exposure: YES- exposed to positive covid patient (son) and strep (mother in law  Therapies tried and outcome: ibuprofen and tylenol       Reviewed and updated as needed this visit by Provider  Tobacco  Allergies  Meds  Problems  Med Hx  Surg Hx  Fam Hx          ROS: Constitutional, HEENT, cardiovascular, pulmonary, GI, , musculoskeletal, neuro, skin, endocrine and psych systems are negative, except as otherwise noted.       Objective   Reported vitals:  There were no vitals taken for this visit.   Gen:  healthy, alert, and no distress  Psych: Alert and oriented times 3; coherent speech, normal   rate and volume, able to articulate logical thoughts, able   to abstract reason, no tangential thoughts, no hallucinations   or delusions.  His affect is normal.    Diagnostic Test Results:  Labs reviewed in Epic  Strep screen -pending    Phone call duration:  5 minutes

## 2020-11-11 DIAGNOSIS — Z20.822 SUSPECTED COVID-19 VIRUS INFECTION: Primary | ICD-10-CM

## 2020-11-12 DIAGNOSIS — J02.9 SORE THROAT: ICD-10-CM

## 2020-11-12 DIAGNOSIS — Z20.822 SUSPECTED COVID-19 VIRUS INFECTION: ICD-10-CM

## 2020-11-12 LAB
DEPRECATED S PYO AG THROAT QL EIA: NEGATIVE
SPECIMEN SOURCE: NORMAL
SPECIMEN SOURCE: NORMAL
STREP GROUP A PCR: NOT DETECTED

## 2020-11-12 PROCEDURE — 99N1174 PR STATISTIC STREP A RAPID: Performed by: FAMILY MEDICINE

## 2020-11-12 PROCEDURE — U0003 INFECTIOUS AGENT DETECTION BY NUCLEIC ACID (DNA OR RNA); SEVERE ACUTE RESPIRATORY SYNDROME CORONAVIRUS 2 (SARS-COV-2) (CORONAVIRUS DISEASE [COVID-19]), AMPLIFIED PROBE TECHNIQUE, MAKING USE OF HIGH THROUGHPUT TECHNOLOGIES AS DESCRIBED BY CMS-2020-01-R: HCPCS | Performed by: INTERNAL MEDICINE

## 2020-11-12 PROCEDURE — 87651 STREP A DNA AMP PROBE: CPT | Performed by: FAMILY MEDICINE

## 2020-11-14 LAB
SARS-COV-2 RNA SPEC QL NAA+PROBE: ABNORMAL
SPECIMEN SOURCE: ABNORMAL

## 2020-11-15 NOTE — RESULT ENCOUNTER NOTE
Dear Paco,    Here is a summary of your recent test results:  -COVID-19 Virus PCR Result =  Detected, abnormal result.      For additional lab test information, [email or web address] is an excellent reference.    In addition, here is a list of due or overdue Health Maintenance reminders:  Hepatitis C Screening due on 01/19/1984  Preventive Care Visit due on 06/24/2011  Zoster (Shingles) Vaccine(1 of 2) due on 01/19/2016  Kidney Microalbumin Urine Test due on 12/05/2018  PHQ-2 due on 01/01/2020    Please call us at [phone] (or use JustSpotted) to address the above recommendations if needed.           Thank you very much for trusting me and [facility].     Have a peaceful day.    Healthy regards,  José Miguel Gaviria MD

## 2020-12-07 ENCOUNTER — MYC MEDICAL ADVICE (OUTPATIENT)
Dept: FAMILY MEDICINE | Facility: CLINIC | Age: 55
End: 2020-12-07

## 2020-12-07 DIAGNOSIS — E03.9 HYPOTHYROIDISM, UNSPECIFIED TYPE: Primary | ICD-10-CM

## 2020-12-07 DIAGNOSIS — R53.83 FATIGUE, UNSPECIFIED TYPE: ICD-10-CM

## 2020-12-07 NOTE — TELEPHONE ENCOUNTER
Health Maintenance Due   Topic Date Due     ANNUAL REVIEW OF HM ORDERS  1965     HEPATITIS C SCREENING  01/19/1984     PREVENTIVE CARE VISIT  06/24/2011     ZOSTER IMMUNIZATION (1 of 2) 01/19/2016     MICROALBUMIN  12/05/2018     PHQ-2  01/01/2020     Routing to PCP for further review/recommendations/orders.  OK for requested labs?      Leigh Lee RN  [facility]

## 2020-12-21 DIAGNOSIS — R53.83 FATIGUE, UNSPECIFIED TYPE: ICD-10-CM

## 2020-12-21 DIAGNOSIS — E03.9 HYPOTHYROIDISM, UNSPECIFIED TYPE: ICD-10-CM

## 2020-12-21 LAB
CORTIS SERPL-MCNC: 8.3 UG/DL (ref 4–22)
ERYTHROCYTE [DISTWIDTH] IN BLOOD BY AUTOMATED COUNT: 13.6 % (ref 10–15)
HCT VFR BLD AUTO: 45.3 % (ref 40–53)
HGB BLD-MCNC: 15.8 G/DL (ref 13.3–17.7)
MCH RBC QN AUTO: 28.1 PG (ref 26.5–33)
MCHC RBC AUTO-ENTMCNC: 34.9 G/DL (ref 31.5–36.5)
MCV RBC AUTO: 81 FL (ref 78–100)
PLATELET # BLD AUTO: 318 10E9/L (ref 150–450)
RBC # BLD AUTO: 5.62 10E12/L (ref 4.4–5.9)
T3FREE SERPL-MCNC: 3.1 PG/ML (ref 2.3–4.2)
WBC # BLD AUTO: 6.7 10E9/L (ref 4–11)

## 2020-12-21 PROCEDURE — 82627 DEHYDROEPIANDROSTERONE: CPT | Performed by: FAMILY MEDICINE

## 2020-12-21 PROCEDURE — 84439 ASSAY OF FREE THYROXINE: CPT | Performed by: FAMILY MEDICINE

## 2020-12-21 PROCEDURE — 84481 FREE ASSAY (FT-3): CPT | Performed by: FAMILY MEDICINE

## 2020-12-21 PROCEDURE — 85027 COMPLETE CBC AUTOMATED: CPT | Performed by: FAMILY MEDICINE

## 2020-12-21 PROCEDURE — 82533 TOTAL CORTISOL: CPT | Performed by: FAMILY MEDICINE

## 2020-12-21 PROCEDURE — 84443 ASSAY THYROID STIM HORMONE: CPT | Performed by: FAMILY MEDICINE

## 2020-12-21 PROCEDURE — 36415 COLL VENOUS BLD VENIPUNCTURE: CPT | Performed by: FAMILY MEDICINE

## 2020-12-22 LAB
DHEA-S SERPL-MCNC: 41 UG/DL (ref 80–560)
T4 FREE SERPL-MCNC: 1.26 NG/DL (ref 0.76–1.46)
TSH SERPL DL<=0.005 MIU/L-ACNC: 0.34 MU/L (ref 0.4–4)

## 2020-12-22 NOTE — RESULT ENCOUNTER NOTE
Dear Paco,    Here is a summary of your recent test results:  Labs are okay and continue current therapies    For additional lab test information, [email or web address] is an excellent reference.    In addition, here is a list of due or overdue Health Maintenance reminders:  ANNUAL REVIEW OF HM ORDERS due on 1965  Hepatitis C Screening due on 01/19/1984  Preventive Care Visit due on 06/24/2011  Zoster (Shingles) Vaccine(1 of 2) due on 01/19/2016  Kidney Microalbumin Urine Test due on 12/05/2018  PHQ-2 due on 01/01/2020  Cholesterol Lab due on 01/10/2021    Please call us at [phone] (or use Net[employer]) to address the above recommendations if needed.           Thank you very much for trusting me and [facility].     Have a peaceful day and a Merry [place]!    Healthy regards,  José Miguel Gaviria MD

## 2021-01-09 ENCOUNTER — HEALTH MAINTENANCE LETTER (OUTPATIENT)
Age: 56
End: 2021-01-09

## 2021-05-22 ENCOUNTER — MYC MEDICAL ADVICE (OUTPATIENT)
Dept: FAMILY MEDICINE | Facility: CLINIC | Age: 56
End: 2021-05-22

## 2021-05-22 DIAGNOSIS — R53.83 FATIGUE, UNSPECIFIED TYPE: ICD-10-CM

## 2021-05-22 DIAGNOSIS — Z13.220 SCREENING FOR LIPID DISORDERS: ICD-10-CM

## 2021-05-22 DIAGNOSIS — E03.9 HYPOTHYROIDISM, UNSPECIFIED TYPE: Primary | ICD-10-CM

## 2021-05-26 ENCOUNTER — TELEPHONE (OUTPATIENT)
Dept: FAMILY MEDICINE | Facility: CLINIC | Age: 56
End: 2021-05-26

## 2021-05-26 NOTE — TELEPHONE ENCOUNTER
Patient would like orders placed for :   Cortisol  Dhea  TSH, T4 and T3   Metabloic   Cholesterol    Please place orders and I will call patient to schedule an appointment per his request      Lalitha Valdes

## 2021-06-09 DIAGNOSIS — E03.9 HYPOTHYROIDISM, UNSPECIFIED TYPE: ICD-10-CM

## 2021-06-09 NOTE — TELEPHONE ENCOUNTER
Routing refill request to provider for review/approval because:  Labs out of range:    TSH   Date Value Ref Range Status   12/21/2020 0.34 (L) 0.40 - 4.00 mU/L Final     Emile GARZA RN   [facility]

## 2021-06-10 RX ORDER — LEVOTHYROXINE SODIUM 175 UG/1
TABLET ORAL
Qty: 90 TABLET | Refills: 1 | Status: SHIPPED | OUTPATIENT
Start: 2021-06-10 | End: 2021-12-07

## 2021-08-04 ENCOUNTER — LAB (OUTPATIENT)
Dept: LAB | Facility: CLINIC | Age: 56
End: 2021-08-04
Payer: COMMERCIAL

## 2021-08-04 DIAGNOSIS — R53.83 FATIGUE, UNSPECIFIED TYPE: ICD-10-CM

## 2021-08-04 DIAGNOSIS — E03.9 HYPOTHYROIDISM, UNSPECIFIED TYPE: ICD-10-CM

## 2021-08-04 DIAGNOSIS — Z13.220 SCREENING FOR LIPID DISORDERS: ICD-10-CM

## 2021-08-04 LAB
CORTIS SERPL-MCNC: 9 UG/DL (ref 4–22)
T3FREE SERPL-MCNC: 3.3 PG/ML (ref 2.3–4.2)

## 2021-08-04 PROCEDURE — 82627 DEHYDROEPIANDROSTERONE: CPT

## 2021-08-04 PROCEDURE — 82533 TOTAL CORTISOL: CPT

## 2021-08-04 PROCEDURE — 84443 ASSAY THYROID STIM HORMONE: CPT

## 2021-08-04 PROCEDURE — 36415 COLL VENOUS BLD VENIPUNCTURE: CPT

## 2021-08-04 PROCEDURE — 84481 FREE ASSAY (FT-3): CPT

## 2021-08-04 PROCEDURE — 80053 COMPREHEN METABOLIC PANEL: CPT

## 2021-08-04 PROCEDURE — 84439 ASSAY OF FREE THYROXINE: CPT

## 2021-08-04 PROCEDURE — 80061 LIPID PANEL: CPT

## 2021-08-05 LAB
ALBUMIN SERPL-MCNC: 4.3 G/DL (ref 3.4–5)
ALP SERPL-CCNC: 83 U/L (ref 40–150)
ALT SERPL W P-5'-P-CCNC: 34 U/L (ref 0–70)
ANION GAP SERPL CALCULATED.3IONS-SCNC: 7 MMOL/L (ref 3–14)
AST SERPL W P-5'-P-CCNC: 14 U/L (ref 0–45)
BILIRUB SERPL-MCNC: 0.5 MG/DL (ref 0.2–1.3)
BUN SERPL-MCNC: 16 MG/DL (ref 7–30)
CALCIUM SERPL-MCNC: 9.1 MG/DL (ref 8.5–10.1)
CHLORIDE BLD-SCNC: 106 MMOL/L (ref 94–109)
CHOLEST SERPL-MCNC: 261 MG/DL
CO2 SERPL-SCNC: 23 MMOL/L (ref 20–32)
CREAT SERPL-MCNC: 1.14 MG/DL (ref 0.66–1.25)
DHEA-S SERPL-MCNC: 51 UG/DL (ref 80–560)
FASTING STATUS PATIENT QL REPORTED: YES
GFR SERPL CREATININE-BSD FRML MDRD: 72 ML/MIN/1.73M2
GLUCOSE BLD-MCNC: 89 MG/DL (ref 70–99)
HDLC SERPL-MCNC: 43 MG/DL
LDLC SERPL CALC-MCNC: 168 MG/DL
NONHDLC SERPL-MCNC: 218 MG/DL
POTASSIUM BLD-SCNC: 4.4 MMOL/L (ref 3.4–5.3)
PROT SERPL-MCNC: 7.4 G/DL (ref 6.8–8.8)
SODIUM SERPL-SCNC: 136 MMOL/L (ref 133–144)
T4 FREE SERPL-MCNC: 1.28 NG/DL (ref 0.76–1.46)
TRIGL SERPL-MCNC: 252 MG/DL
TSH SERPL DL<=0.005 MIU/L-ACNC: 0.97 MU/L (ref 0.4–4)

## 2021-08-09 ENCOUNTER — VIRTUAL VISIT (OUTPATIENT)
Dept: FAMILY MEDICINE | Facility: CLINIC | Age: 56
End: 2021-08-09
Payer: COMMERCIAL

## 2021-08-09 DIAGNOSIS — R13.10 DYSPHAGIA, UNSPECIFIED TYPE: Primary | ICD-10-CM

## 2021-08-09 PROCEDURE — 99213 OFFICE O/P EST LOW 20 MIN: CPT | Mod: 95 | Performed by: FAMILY MEDICINE

## 2021-08-17 DIAGNOSIS — Z11.59 ENCOUNTER FOR SCREENING FOR OTHER VIRAL DISEASES: ICD-10-CM

## 2021-08-22 ENCOUNTER — LAB (OUTPATIENT)
Dept: URGENT CARE | Facility: URGENT CARE | Age: 56
End: 2021-08-22
Attending: INTERNAL MEDICINE
Payer: COMMERCIAL

## 2021-08-22 DIAGNOSIS — Z11.59 ENCOUNTER FOR SCREENING FOR OTHER VIRAL DISEASES: ICD-10-CM

## 2021-08-22 PROCEDURE — U0005 INFEC AGEN DETEC AMPLI PROBE: HCPCS

## 2021-08-22 PROCEDURE — U0003 INFECTIOUS AGENT DETECTION BY NUCLEIC ACID (DNA OR RNA); SEVERE ACUTE RESPIRATORY SYNDROME CORONAVIRUS 2 (SARS-COV-2) (CORONAVIRUS DISEASE [COVID-19]), AMPLIFIED PROBE TECHNIQUE, MAKING USE OF HIGH THROUGHPUT TECHNOLOGIES AS DESCRIBED BY CMS-2020-01-R: HCPCS

## 2021-08-23 LAB — SARS-COV-2 RNA RESP QL NAA+PROBE: NEGATIVE

## 2021-08-25 ENCOUNTER — HOSPITAL ENCOUNTER (OUTPATIENT)
Facility: CLINIC | Age: 56
Discharge: HOME | End: 2021-08-25
Attending: INTERNAL MEDICINE | Admitting: INTERNAL MEDICINE
Payer: COMMERCIAL

## 2021-08-25 VITALS
WEIGHT: 220 LBS | DIASTOLIC BLOOD PRESSURE: 106 MMHG | SYSTOLIC BLOOD PRESSURE: 117 MMHG | RESPIRATION RATE: 16 BRPM | BODY MASS INDEX: 29.16 KG/M2 | HEIGHT: 73 IN | OXYGEN SATURATION: 96 % | HEART RATE: 68 BPM

## 2021-08-25 DIAGNOSIS — K21.00 GASTROESOPHAGEAL REFLUX DISEASE WITH ESOPHAGITIS WITHOUT HEMORRHAGE: Primary | ICD-10-CM

## 2021-08-25 LAB — UPPER GI ENDOSCOPY: NORMAL

## 2021-08-25 PROCEDURE — 43249 ESOPH EGD DILATION <30 MM: CPT | Performed by: INTERNAL MEDICINE

## 2021-08-25 PROCEDURE — 43239 EGD BIOPSY SINGLE/MULTIPLE: CPT | Performed by: INTERNAL MEDICINE

## 2021-08-25 PROCEDURE — 250N000011 HC RX IP 250 OP 636: Performed by: INTERNAL MEDICINE

## 2021-08-25 PROCEDURE — 999N000099 HC STATISTIC MODERATE SEDATION < 10 MIN: Performed by: INTERNAL MEDICINE

## 2021-08-25 PROCEDURE — 250N000009 HC RX 250: Performed by: INTERNAL MEDICINE

## 2021-08-25 PROCEDURE — 88305 TISSUE EXAM BY PATHOLOGIST: CPT | Mod: TC | Performed by: INTERNAL MEDICINE

## 2021-08-25 PROCEDURE — 88305 TISSUE EXAM BY PATHOLOGIST: CPT | Mod: 26 | Performed by: PATHOLOGY

## 2021-08-25 RX ORDER — FLUMAZENIL 0.1 MG/ML
0.2 INJECTION, SOLUTION INTRAVENOUS
Status: DISCONTINUED | OUTPATIENT
Start: 2021-08-25 | End: 2021-08-25 | Stop reason: HOSPADM

## 2021-08-25 RX ORDER — FENTANYL CITRATE 50 UG/ML
50-100 INJECTION, SOLUTION INTRAMUSCULAR; INTRAVENOUS
Status: COMPLETED | OUTPATIENT
Start: 2021-08-25 | End: 2021-08-25

## 2021-08-25 RX ORDER — FENTANYL CITRATE 50 UG/ML
25-50 INJECTION, SOLUTION INTRAMUSCULAR; INTRAVENOUS
Status: DISCONTINUED | OUTPATIENT
Start: 2021-08-25 | End: 2021-08-25 | Stop reason: HOSPADM

## 2021-08-25 RX ORDER — LIDOCAINE 40 MG/G
CREAM TOPICAL
Status: DISCONTINUED | OUTPATIENT
Start: 2021-08-25 | End: 2021-08-25 | Stop reason: HOSPADM

## 2021-08-25 RX ORDER — ONDANSETRON 2 MG/ML
4 INJECTION INTRAMUSCULAR; INTRAVENOUS EVERY 6 HOURS PRN
Status: DISCONTINUED | OUTPATIENT
Start: 2021-08-25 | End: 2021-08-25 | Stop reason: HOSPADM

## 2021-08-25 RX ORDER — NALOXONE HYDROCHLORIDE 0.4 MG/ML
0.2 INJECTION, SOLUTION INTRAMUSCULAR; INTRAVENOUS; SUBCUTANEOUS
Status: DISCONTINUED | OUTPATIENT
Start: 2021-08-25 | End: 2021-08-25 | Stop reason: HOSPADM

## 2021-08-25 RX ORDER — PROCHLORPERAZINE MALEATE 10 MG
10 TABLET ORAL EVERY 6 HOURS PRN
Status: DISCONTINUED | OUTPATIENT
Start: 2021-08-25 | End: 2021-08-25 | Stop reason: HOSPADM

## 2021-08-25 RX ORDER — ONDANSETRON 4 MG/1
4 TABLET, ORALLY DISINTEGRATING ORAL EVERY 6 HOURS PRN
Status: DISCONTINUED | OUTPATIENT
Start: 2021-08-25 | End: 2021-08-25 | Stop reason: HOSPADM

## 2021-08-25 RX ORDER — ATROPINE SULFATE 0.4 MG/ML
0.4 AMPUL (ML) INJECTION
Status: DISCONTINUED | OUTPATIENT
Start: 2021-08-25 | End: 2021-08-25 | Stop reason: HOSPADM

## 2021-08-25 RX ORDER — PANTOPRAZOLE SODIUM 20 MG/1
40 TABLET, DELAYED RELEASE ORAL
Qty: 90 TABLET | Refills: 3 | Status: SHIPPED | OUTPATIENT
Start: 2021-08-25 | End: 2022-02-15

## 2021-08-25 RX ORDER — ONDANSETRON 2 MG/ML
4 INJECTION INTRAMUSCULAR; INTRAVENOUS
Status: DISCONTINUED | OUTPATIENT
Start: 2021-08-25 | End: 2021-08-25 | Stop reason: HOSPADM

## 2021-08-25 RX ORDER — NALOXONE HYDROCHLORIDE 0.4 MG/ML
0.4 INJECTION, SOLUTION INTRAMUSCULAR; INTRAVENOUS; SUBCUTANEOUS
Status: DISCONTINUED | OUTPATIENT
Start: 2021-08-25 | End: 2021-08-25 | Stop reason: HOSPADM

## 2021-08-25 RX ORDER — SIMETHICONE 40MG/0.6ML
133 SUSPENSION, DROPS(FINAL DOSAGE FORM)(ML) ORAL
Status: DISCONTINUED | OUTPATIENT
Start: 2021-08-25 | End: 2021-08-25 | Stop reason: HOSPADM

## 2021-08-25 RX ORDER — EPINEPHRINE 1 MG/ML
0.1 INJECTION, SOLUTION, CONCENTRATE INTRAVENOUS
Status: DISCONTINUED | OUTPATIENT
Start: 2021-08-25 | End: 2021-08-25 | Stop reason: HOSPADM

## 2021-08-25 RX ADMIN — FENTANYL CITRATE 100 MCG: 50 INJECTION, SOLUTION INTRAMUSCULAR; INTRAVENOUS at 09:02

## 2021-08-25 RX ADMIN — TOPICAL ANESTHETIC 0.5 ML: 200 SPRAY DENTAL; PERIODONTAL at 09:02

## 2021-08-25 RX ADMIN — MIDAZOLAM 2 MG: 1 INJECTION INTRAMUSCULAR; INTRAVENOUS at 09:02

## 2021-08-25 ASSESSMENT — MIFFLIN-ST. JEOR: SCORE: 1886.79

## 2021-08-25 NOTE — PRE-PROCEDURE
Pre-Endoscopy History and Physical     Paco Fatima MRN# [id number]   YOB: 1965 Age: 56 year old     Date of Procedure: 8/25/2021  Primary care provider: Sonu Gaviria  Type of Endoscopy: esophagogastroduodenoscopy (upper GI endoscopy)  Reason for Procedure: dysphagia  Type of Anesthesia Anticipated: Moderate (conscious) sedation    HPI:    Paco is a 56 year old male who will be undergoing the above procedure.      A history and physical has been performed. The patient's medications and allergies have been reviewed. The risks and benefits of the procedure and the sedation options and risks were discussed with the patient.  All questions were answered and informed consent was obtained.      No Known Allergies     Current Facility-Administered Medications   Medication     0.9% sodium chloride BOLUS     atropine injection 0.4 mg     benzocaine 20% (HURRICAINE/TOPEX) 20 % spray 0.5-2 mL     EPINEPHrine PF (ADRENALIN) injection 0.1 mg     fentaNYL (PF) (SUBLIMAZE) injection  mcg    Followed by     fentaNYL (PF) (SUBLIMAZE) injection 25-50 mcg     flumazenil (ROMAZICON) injection 0.2 mg     glucagon injection 0.5 mg     lidocaine (LMX4) kit     lidocaine 1 % 0.1-1 mL     midazolam (VERSED) injection 1-2 mg    Followed by     midazolam (VERSED) injection 1 mg     ondansetron (ZOFRAN) injection 4 mg     simethicone (MYLICON) suspension 133 mg     sodium chloride (PF) 0.9% PF flush 3 mL     sodium chloride (PF) 0.9% PF flush 3 mL       Patient Active Problem List   Diagnosis     Recurrent Folliculitis     Perioral Dermatitis     Generalized anxiety disorder     Family history of diabetes mellitus     Fasciculations of muscle     Hypotestosteronism     Hyperlipidemia LDL goal <160     Adult ADHD     Other fatigue     Hypothyroidism, unspecified type     Essential hypertension with goal blood pressure less than 140/90        Past Medical History:   Diagnosis Date     Essential hypertension with goal  blood pressure less than 140/90 2016     DAY (generalized anxiety disorder)      GERD (gastroesophageal reflux disease)      h/o Subclinical hypothyroidism 2013     Other fatigue 3/21/2016        Past Surgical History:   Procedure Laterality Date     COLONOSCOPY N/A 2018    Procedure: COLONOSCOPY;;  Surgeon: Noé Sharma MD;  Location: [facility]     ESOPHAGOSCOPY, GASTROSCOPY, DUODENOSCOPY (EGD), COMBINED N/A 2018    Procedure: COMBINED ESOPHAGOSCOPY, GASTROSCOPY, DUODENOSCOPY (EGD);  ESOPHAGOSCOPY, GASTROSCOPY, DUODENOSCOPY (EGD) and COLONOSCOPY (FV)  ;  Surgeon: Noé Sharma MD;  Location: [facility]     HC REMOVAL OF TONSILS,<13 Y/O      Tonsillectomy, under 12 yrs     HC REPAIR RECURR INGUIN DMITRY,REDUCIBL  1987    Hernia, inguinal     SURGICAL HISTORY OF -       deviated septum repair       Social History     Tobacco Use     Smoking status: Never Smoker     Smokeless tobacco: Never Used   Substance Use Topics     Alcohol use: Yes     Comment: 3 beers weekly       Family History   Problem Relation Age of Onset     C.A.D. Maternal Grandmother      C.A.D. Maternal Grandfather      Cancer Father          age 30-Sarcoma     Other Cancer Father      Psychotic Disorder Mother         anxiety     Diabetes Mother         DM-1     Thyroid Disease Mother      Musculoskeletal Disorder Sister 38        MS     Thyroid Disease Sister      Diabetes Sister         DM-1     Lipids No family hx of      Hypertension No family hx of      Prostate Cancer No family hx of      Colon Cancer No family hx of             Medications:     Medications Prior to Admission   Medication Sig Dispense Refill Last Dose     esomeprazole (NEXIUM) 20 MG DR capsule Take 1 capsule (20 mg) by mouth every morning (before breakfast) Take 30-60 minutes before eating.   Past Month at Unknown time     levothyroxine (SYNTHROID/LEVOTHROID) 175 MCG tablet TAKE ONE TABLET BY MOUTH ONCE DAILY 90 tablet 1      minocycline (MINOCIN)  "100 MG capsule Take 1 capsule (100 mg) by mouth daily 60 capsule 1      propranolol (INDERAL) 40 MG tablet Take 1 tablet (40 mg) by mouth 2 times daily as needed (situational anxiety) 90 tablet 0        Scheduled Medications:    fentaNYL   mcg Intravenous Once within 24 hrs     midazolam  1-2 mg Intravenous Once within 24 hrs     sodium chloride (PF)  3 mL Intracatheter Q8H       PRN:  sodium chloride 0.9%, atropine, benzocaine 20%, EPINEPHrine, fentaNYL **FOLLOWED BY** fentaNYL, flumazenil, glucagon, lidocaine 4%, lidocaine (buffered or not buffered), midazolam **FOLLOWED BY** midazolam, ondansetron, simethicone, sodium chloride (PF)    PHYSICAL EXAM:   Ht 1.854 m (6' 1\")   Wt 99.8 kg (220 lb)   BMI 29.03 kg/m   Estimated body mass index is 29.03 kg/m  as calculated from the following:    Height as of this encounter: 1.854 m (6' 1\").    Weight as of this encounter: 99.8 kg (220 lb).   RESP: lungs clear to auscultation - no rales, rhonchi or wheezes  CV: regular rates and rhythm    IMPRESSION   ASA Class 2 - Mild systemic disease      Signed Electronically by: Noé Chen MD  August 25, 2021    .            "

## 2021-08-25 NOTE — LETTER
August 18, 2021      Paco Fatima  [address]  [facility] [address]        Dear Paco,     Thank you for choosing [facility]. You are scheduled for the following service(s).   Please be aware that coverage of these services is subject to the terms and limitations of your health insurance plan.  Call member services at your health plan with any benefit or coverage questions.    Date:   8/25/2021      Procedure: UPPER ENDOSCOPY-EGD  Doctor: Gustavo           Arrival Time:  8:00 am   *Enter and check in at the Main Hospital Entrance  Procedure Time: 8:30 am       Location:   [facility]        Endoscopy Department, First Floor *         [address] [place], [place] [address] [phone] or [phone] ([place]) to reschedule          PRE-PROCEDURE CHECKLIST    If you have diabetes, ask your regular doctor for diet and medication restrictions.  If you take any antiplatelet or anticoagulant medications (such as Coumadin, Lovenox, Plavix, etc.) and have not already discussed this, please call your primary physician for advice on holding this medication.  If you take Aspirin, you may continue to do so.  If you are or may be pregnant, please discuss the risks and benefits of this procedure with your doctor.  You must arrange for a ride for the day of your exam. If you fail to arrange transportation with a responsible adult, your procedure will need to be cancelled and rescheduled. Taxi, bus and medical transport are not acceptable unless you have a responsible adult that you know & trust with you. Please arrange for this  to be able to pick you up in our department, approximately one hour after your scheduled procedure, if they are not able to stay with you.      Canceling or rescheduling   If you must cancel or reschedule your appointment, please call [phone] as soon as possible.      Upper Endoscopy or Esophagogastroduodenoscopy (EGD) is  a test performed to evaluate symptoms of persistent abdominal pain, nausea, vomiting and difficulty swallowing. It may also be used to treat various conditions of the upper gastrointestinal tract, such as bleeding, narrowing or abnormal growths.     What happens during an upper endoscopy?  On the day of your procedure, plan to spend up to one and a half hour after your arrival at the endoscopy center. The exam itself takes about 5 to 10 minutes.    Before the exam:  - You will change into a gown.   - Your medical history and medication list will be reviewed with you, unless it has already been done over the phone.   - A nurse will insert an intravenous (IV) line into your hand or arm.  - The doctor will talk to you and give you a consent form to sign.    During the exam:  - Medicine will be given through the IV line to help you relax and feel comfortable.   - Your heart rate and oxygen levels will be monitored. If your blood pressure is low, you may be given fluids through the IV line.   - The doctor will insert a flexible, hollow tube, called an endoscope, into your mouth and will advance it slowly through the esophagus, stomach and duodenum (the first part of your small intestine).   - You may have a feeling of pressure or fullness.   - If you have difficulty swallowing, and the doctor finds a narrowing in your esophagus, it may be possible for the area to be expanded-dilated during the exam.   - If abnormal tissue is found, the doctor may remove it through the endoscope (biopsy it) for closer examination. The tissue removal is painless.    After the exam:  - Any tissue samples removed during the exam will be sent to a lab for evaluation. It may take 5 to 7 working days for you to be notified of the results  - The doctor will prepare a full report for the physician who referred you for the upper endoscopy.   - The doctor will talk with you about the initial results of your exam.   - You may feel bloated after the  procedure. That is normal and should not last long.   - Your throat may feel sore for a short time.   - Following the exam, you may resume your normal diet. Avoid alcohol until the next day.   - You may resume your regular activities the day after the procedure.   - Medication given during the exam will prohibit you from driving for the rest of the day.  - A nurse will provide you with complete discharge instructions before you leave the endoscopy center. Be sure to ask the nurse for specific instructions if you take blood thinners such as Aspirin , Coumadin , Lovenox , Plavix , etc.       PREPARATION    To ensure a successful exam, please follow all instructions carefully.      The night before your exam:    STOP eating solid foods at 11:45 pm.     Clear liquids are okay to drink (examples: Gatorade , apple juice, clear broth,coffee or tea without milk or cream, etc.).     DO NOT drink red liquids or alcoholic beverages.    The day of your exam:    STOP drinking clear liquids 4 hours before your exam.     You may take your usual medications with 4 oz. of water, but it needs to be at least 4 hours prior to your procedure.    When you leave for the procedure:    Bring a list of all of your current medications, including any allergy or over-the-counter medications, unless you have already reviewed that with an Endoscopy RN over the phone.     Bring a photo ID as well as up-to-date insurance information, such as your insurance card and any referral forms that might be required by your payer.       DIRECTIONS TO THE ENDOSCOPY DEPARTMENT    From the north ([facility])  Take [address] South, exit on [address] . Get into the left hand papi, turn left (east), go one-half mile to [address] and turn left. Go north to the second stoplight, take a right on [address] and follow it to the Main Hospital entrance.  From the south ([facility])  Take 35N to the 35E split and exit on  [address] . On [address] , turn left (west) to [address]. Turn right (north) on [address]. Go north to the second stoplight, take a right on [address] and follow it to the Main Hospital entrance.  From the east via 35E ([facility])  Take 35E south to [place] [address] exit. Turn right on [address] . Go west to [address]. Turn right (north) on [address]. Go to the second stoplight, take a right on [address] to the Main Hospital entrance.  From the east via [address] ([facility])  Take [address] to [address]. Turn left (south) on [address] to [address], turn left (east) on [address] and follow it to the Main Hospital entrance.    From the west via [address] 13 ([place], [place])  Take [address] east to [address]. Turn right (south) on [address] to [address], turn left (east) on [address] and follow it to the Main Hospital entrance.

## 2021-08-25 NOTE — DISCHARGE INSTRUCTIONS
"The patient has received a copy of the Provation  report the doctor has written and discharge instructions have been discussed with the patient and responsible adult.  All questions were addressed and answered prior to patient discharge.      Tips to Control Acid Reflux  To control acid reflux, you ll need to make some basic diet and lifestyle changes. The simple steps outlined below may be all you ll need to relieve discomfort.   Watch What You Eat      Avoid fatty foods and spicy foods.    Eat fewer acidic foods, such as citrus and tomato-based foods. These can increase symptoms.     Limit drinking alcohol, caffeine, and fizzy beverages. All increase acid reflux.    Try limiting chocolate, peppermint, and spearmint. These can worsen acid reflux in some people.    Watch When You Eat    Avoid lying down for 3 hours after eating.    Do not snack before going to bed.    Raise Your Head  Raising your head and upper body by 4\" to 6\" helps limit reflux when you re lying down. Put blocks under the head of the bed frame to raise it.                    [id number] [facility], [address] [place] [address], [place] [address]. All rights reserved. This information is not intended as a substitute for professional medical care. Always follow your healthcare professional's instructions.    .[email or web address]      "

## 2021-08-26 LAB
PATH REPORT.COMMENTS IMP SPEC: NORMAL
PATH REPORT.COMMENTS IMP SPEC: NORMAL
PATH REPORT.FINAL DX SPEC: NORMAL
PATH REPORT.GROSS SPEC: NORMAL
PATH REPORT.MICROSCOPIC SPEC OTHER STN: NORMAL
PATH REPORT.RELEVANT HX SPEC: NORMAL
PHOTO IMAGE: NORMAL

## 2021-08-31 ENCOUNTER — MYC MEDICAL ADVICE (OUTPATIENT)
Dept: FAMILY MEDICINE | Facility: CLINIC | Age: 56
End: 2021-08-31

## 2021-09-02 ENCOUNTER — TELEPHONE (OUTPATIENT)
Dept: FAMILY MEDICINE | Facility: CLINIC | Age: 56
End: 2021-09-02

## 2021-09-02 NOTE — TELEPHONE ENCOUNTER
S-(situation): Pt calling with questions on surg path report    B-(background): Pt noted he had a biopsy done last week, wondered if results were in.    A-(assessment): Pt advised of results per pathology report.  Final Diagnosis   A(1). Esophagus, biopsy:  -Squamous mucosa with reactive epithelial changes of the type seen in reflux esophagitis.  -Negative for intestinal metaplasia.  -Negative for acute or eosinophilic esophagitis.  -Negative for dysplasia or malignancy.         Electronically signed by Trino Wiley MD PhD on 8/26/2021 at 10:20 AM      Patient stated an understanding and agreed.    R-(recommendations): Pt noted had no further questions, advised could send report, pt declined.    Emile GARZA RN   [facility]

## 2021-09-02 NOTE — TELEPHONE ENCOUNTER
This was addressed in a separate phone encounter dated today, 09/02. Closing this encounter.  Cristal CURRAN CMA ([facility]) 9/2/2021  2:50 PM

## 2021-10-23 ENCOUNTER — HEALTH MAINTENANCE LETTER (OUTPATIENT)
Age: 56
End: 2021-10-23

## 2022-02-12 ENCOUNTER — HEALTH MAINTENANCE LETTER (OUTPATIENT)
Age: 57
End: 2022-02-12

## 2022-02-15 ENCOUNTER — VIRTUAL VISIT (OUTPATIENT)
Dept: FAMILY MEDICINE | Facility: CLINIC | Age: 57
End: 2022-02-15
Payer: COMMERCIAL

## 2022-02-15 DIAGNOSIS — Z12.5 SCREENING FOR MALIGNANT NEOPLASM OF PROSTATE: ICD-10-CM

## 2022-02-15 DIAGNOSIS — L71.9 ROSACEA: ICD-10-CM

## 2022-02-15 DIAGNOSIS — R68.89 EXERCISE INTOLERANCE: ICD-10-CM

## 2022-02-15 DIAGNOSIS — R53.83 FATIGUE, UNSPECIFIED TYPE: ICD-10-CM

## 2022-02-15 DIAGNOSIS — L70.9 ACNE, UNSPECIFIED ACNE TYPE: ICD-10-CM

## 2022-02-15 DIAGNOSIS — E03.9 HYPOTHYROIDISM, UNSPECIFIED TYPE: ICD-10-CM

## 2022-02-15 DIAGNOSIS — R13.10 DYSPHAGIA, UNSPECIFIED TYPE: Primary | ICD-10-CM

## 2022-02-15 PROCEDURE — 99213 OFFICE O/P EST LOW 20 MIN: CPT | Mod: 95 | Performed by: FAMILY MEDICINE

## 2022-02-15 RX ORDER — MINOCYCLINE HYDROCHLORIDE 100 MG/1
100 CAPSULE ORAL DAILY
Qty: 60 CAPSULE | Refills: 1 | Status: SHIPPED | OUTPATIENT
Start: 2022-02-15 | End: 2023-11-13

## 2022-02-15 ASSESSMENT — ANXIETY QUESTIONNAIRES
3. WORRYING TOO MUCH ABOUT DIFFERENT THINGS: NOT AT ALL
IF YOU CHECKED OFF ANY PROBLEMS ON THIS QUESTIONNAIRE, HOW DIFFICULT HAVE THESE PROBLEMS MADE IT FOR YOU TO DO YOUR WORK, TAKE CARE OF THINGS AT HOME, OR GET ALONG WITH OTHER PEOPLE: NOT DIFFICULT AT ALL
7. FEELING AFRAID AS IF SOMETHING AWFUL MIGHT HAPPEN: NOT AT ALL
GAD7 TOTAL SCORE: 1
5. BEING SO RESTLESS THAT IT IS HARD TO SIT STILL: NOT AT ALL
6. BECOMING EASILY ANNOYED OR IRRITABLE: SEVERAL DAYS
1. FEELING NERVOUS, ANXIOUS, OR ON EDGE: NOT AT ALL
2. NOT BEING ABLE TO STOP OR CONTROL WORRYING: NOT AT ALL

## 2022-02-15 ASSESSMENT — PATIENT HEALTH QUESTIONNAIRE - PHQ9
5. POOR APPETITE OR OVEREATING: NOT AT ALL
SUM OF ALL RESPONSES TO PHQ QUESTIONS 1-9: 1

## 2022-02-15 NOTE — PROGRESS NOTES
"Paco is a 57 year old who is being evaluated via a billable telephone visit.      What phone number would you like to be contacted at? [phone]  How would you like to obtain your AVS? MyChart    Assessment & Plan   Dysphagia, unspecified type  Gets occasional bread or meat stuck.  Recommended taking smaller pieces and chewing it well.  Has been off the PPIs and suggested he restart that if symptoms worsen and may need a repeat EGD    Fatigue, unspecified type  Exercise intolerance  Recurrent symptoms of unclear cause.  He is concerned about his DHEA and cortisol levels and will check a morning lab.  Has seen endocrinologist and would like second opinion from [facility] providers.  - Cortisol  - DHEA sulfate  - Adult Endocrinology  Referral    Acne, unspecified acne type  Increase symptoms that he thinks may be related to his hormones, okay to continue antibiotic as needed intermittently  - DHEA sulfate  - Adult Endocrinology  Referral  - minocycline (MINOCIN) 100 MG capsule  Dispense: 60 capsule; Refill: 1    Hypothyroidism, unspecified type  We will check labs and continue replacement  - TSH  - T3, Free  - T4, free  - Adult Endocrinology  Referral        Perioral Dermatitis  Intermittent and will restart meds as needed  - minocycline (MINOCIN) 100 MG capsule  Dispense: 60 capsule; Refill: 1    Screening for malignant neoplasm of prostate  - PSA, screen      BMI:   Estimated body mass index is 29.03 kg/m  as calculated from the following:    Height as of 8/25/21: 1.854 m (6' 1\").    Weight as of 8/25/21: 99.8 kg (220 lb).   Weight management plan: Discussed healthy diet and exercise guidelines      Return in about 1 month (around 3/15/2022) for symptoms failing to improve or sooner if worsening.      José Miguel Gaviria MD      [address] [address]  [email or web address]   Office: [phone]       Franco Antonio is a 57 " year old who presents for the following health issues  accompanied by his self.    History of Present Illness       He eats 0-1 servings of fruits and vegetables daily.He consumes 0 sweetened beverage(s) daily.He exercises with enough effort to increase his heart rate 10 to 19 minutes per day.  He exercises with enough effort to increase his heart rate 3 or less days per week.   He is taking medications regularly.     Dysphagia / he is wondering about esophagitis? - patient thinks due to low Adrenal functions - he would like to have labs done     Depression and Anxiety Follow-Up    How are you doing with your depression since your last visit? No change    How are you doing with your anxiety since your last visit?  No change    Are you having other symptoms that might be associated with depression or anxiety? No    Have you had a significant life event? No     Do you have any concerns with your use of alcohol or other drugs? No    Social History     Tobacco Use     Smoking status: Never Smoker     Smokeless tobacco: Never Used   Substance Use Topics     Alcohol use: Yes     Comment: 3 beers weekly     Drug use: No     PHQ 7/17/2018 6/11/2019 2/15/2022   PHQ-9 Total Score 2 1 1   Q9: Thoughts of better off dead/self-harm past 2 weeks Not at all Not at all Not at all     DAY-7 SCORE 7/17/2018 6/11/2019 2/15/2022   Total Score - - -   Total Score 1 2 1     Last PHQ-9 2/15/2022   1.  Little interest or pleasure in doing things 0   2.  Feeling down, depressed, or hopeless 0   3.  Trouble falling or staying asleep, or sleeping too much 0   4.  Feeling tired or having little energy 1   5.  Poor appetite or overeating 0   6.  Feeling bad about yourself 0   7.  Trouble concentrating 0   8.  Moving slowly or restless 0   Q9: Thoughts of better off dead/self-harm past 2 weeks 0   PHQ-9 Total Score 1   Difficulty at work, home, or with people Not difficult at all     DAY-7  2/15/2022   1. Feeling nervous, anxious, or on edge 0    2. Not being able to stop or control worrying 0   3. Worrying too much about different things 0   4. Trouble relaxing 0   5. Being so restless that it is hard to sit still 0   6. Becoming easily annoyed or irritable 1   7. Feeling afraid, as if something awful might happen 0   DAY-7 Total Score 1   If you checked any problems, how difficult have they made it for you to do your work, take care of things at home, or get along with other people? Not difficult at all       Suicide Assessment Five-step Evaluation and Treatment (SAFE-T)      Hypothyroidism Follow-up      Since last visit, patient describes the following symptoms: Weight stable, no hair loss, no skin changes, no constipation, no loose stools    Review of Systems   Constitutional, HEENT, cardiovascular, pulmonary, gi and gu systems are negative, except as otherwise noted.      Objective           Vitals:  No vitals were obtained today due to virtual visit.    Physical Exam   healthy, alert and no distress  PSYCH: Alert and oriented times 3; coherent speech, normal   rate and volume, able to articulate logical thoughts, able   to abstract reason, no tangential thoughts, no hallucinations   or delusions  His affect is normal  RESP: No cough, no audible wheezing, able to talk in full sentences  Remainder of exam unable to be completed due to telephone visits          Phone call duration: 13 minutes

## 2022-02-16 ASSESSMENT — ANXIETY QUESTIONNAIRES: GAD7 TOTAL SCORE: 1

## 2022-02-18 ENCOUNTER — LAB (OUTPATIENT)
Dept: LAB | Facility: CLINIC | Age: 57
End: 2022-02-18
Payer: COMMERCIAL

## 2022-02-18 DIAGNOSIS — R53.83 FATIGUE, UNSPECIFIED TYPE: ICD-10-CM

## 2022-02-18 DIAGNOSIS — Z12.5 SCREENING FOR MALIGNANT NEOPLASM OF PROSTATE: ICD-10-CM

## 2022-02-18 DIAGNOSIS — R68.89 EXERCISE INTOLERANCE: ICD-10-CM

## 2022-02-18 DIAGNOSIS — L70.9 ACNE, UNSPECIFIED ACNE TYPE: ICD-10-CM

## 2022-02-18 DIAGNOSIS — E03.9 HYPOTHYROIDISM, UNSPECIFIED TYPE: ICD-10-CM

## 2022-02-18 LAB
CORTIS SERPL-MCNC: 19 UG/DL (ref 4–22)
PSA SERPL-MCNC: 0.82 UG/L (ref 0–4)
T3FREE SERPL-MCNC: 2.9 PG/ML (ref 2.3–4.2)
T4 FREE SERPL-MCNC: 1.11 NG/DL (ref 0.76–1.46)
TSH SERPL DL<=0.005 MIU/L-ACNC: 0.7 MU/L (ref 0.4–4)

## 2022-02-18 PROCEDURE — 82533 TOTAL CORTISOL: CPT

## 2022-02-18 PROCEDURE — 84443 ASSAY THYROID STIM HORMONE: CPT

## 2022-02-18 PROCEDURE — G0103 PSA SCREENING: HCPCS

## 2022-02-18 PROCEDURE — 36415 COLL VENOUS BLD VENIPUNCTURE: CPT

## 2022-02-18 PROCEDURE — 82627 DEHYDROEPIANDROSTERONE: CPT

## 2022-02-18 PROCEDURE — 84481 FREE ASSAY (FT-3): CPT

## 2022-02-18 PROCEDURE — 84439 ASSAY OF FREE THYROXINE: CPT

## 2022-02-19 NOTE — RESULT ENCOUNTER NOTE
Dear Paco,    Here is a summary of your recent test results:  -All of your labs are normal.    For additional lab test information, www.testing.com is a very good reference.    In addition, here is a list of due or overdue Health Maintenance reminders:  Discuss Advance Care Planning Never done  Preventive Care Visit due on 06/24/2011  Zoster (Shingles) Vaccine(1 of 2) Never done  Complete Blood Count due on 12/21/2021    Please call us at [phone] (or use [employer]) to address the above recommendations if needed.           Thank you very much for trusting me and [facility].     Have a peaceful day.    Healthy regards,  José Mgiuel Gaviria MD

## 2022-02-21 LAB — DHEA-S SERPL-MCNC: 44 UG/DL (ref 80–560)

## 2022-02-21 NOTE — RESULT ENCOUNTER NOTE
Dear Paco,    Here is a summary of your recent test results:  DHEA is still low but unsure what to do with this finding           Thank you very much for trusting me and [facility].     Have a peaceful day.    Healthy regards,  José Miguel Gaviria MD

## 2022-05-25 NOTE — TELEPHONE ENCOUNTER
"Requested Prescriptions   Pending Prescriptions Disp Refills     levothyroxine (SYNTHROID/LEVOTHROID) 175 MCG tablet [Pharmacy Med Name: LEVOTHYROXINE SODIUM 175MCG TABS]        Last Written Prescription Date:  6.21.19  Last Fill Quantity: 90 tablet,  # refills: 0   Last office visit: 6/11/2019 with prescribing provider:  Sonu Gaviria MD             Future Office Visit:   Next 5 appointments (look out 90 days)    Oct 04, 2019  8:00 AM CDT  Lab visit with [facility] LAB  [facility] [address] [place] [id number]  [phone]   Oct 14, 2019  8:40 AM CDT  MyChart Short with Sonu Gaviria MD  [facility] ([address] [address]  [phone]            90 tablet 0     Sig: TAKE ONE TABLET BY MOUTH ONCE DAILY       Thyroid Protocol Passed - 9/20/2019  2:56 PM        Passed - Patient is 12 years or older        Passed - Recent (12 mo) or future (30 days) visit within the authorizing provider's specialty     Patient had office visit in the last 12 months or has a visit in the next 30 days with authorizing provider or within the authorizing provider's specialty.  See \"Patient Info\" tab in inbasket, or \"Choose Columns\" in Meds & Orders section of the refill encounter.              Passed - Medication is active on med list        Passed - Normal TSH on file in past 12 months     Recent Labs   Lab Test 06/11/19  0907   TSH 1.53              " (3) adequate

## 2022-10-09 ENCOUNTER — HEALTH MAINTENANCE LETTER (OUTPATIENT)
Age: 57
End: 2022-10-09

## 2022-12-16 ENCOUNTER — MYC MEDICAL ADVICE (OUTPATIENT)
Dept: FAMILY MEDICINE | Facility: CLINIC | Age: 57
End: 2022-12-16

## 2022-12-16 DIAGNOSIS — R53.83 FATIGUE, UNSPECIFIED TYPE: Primary | ICD-10-CM

## 2022-12-16 DIAGNOSIS — E03.9 HYPOTHYROIDISM, UNSPECIFIED TYPE: ICD-10-CM

## 2022-12-19 NOTE — TELEPHONE ENCOUNTER
Please see my chart message below     Please review and advise     Thank you     Stacy Tipton RN, BSN  [facility]

## 2022-12-21 RX ORDER — LEVOTHYROXINE SODIUM 175 UG/1
175 TABLET ORAL DAILY
Qty: 90 TABLET | Refills: 1 | Status: SHIPPED | OUTPATIENT
Start: 2022-12-21 | End: 2023-06-16

## 2022-12-27 ENCOUNTER — LAB (OUTPATIENT)
Dept: LAB | Facility: CLINIC | Age: 57
End: 2022-12-27
Payer: COMMERCIAL

## 2022-12-27 DIAGNOSIS — R53.83 FATIGUE, UNSPECIFIED TYPE: ICD-10-CM

## 2022-12-27 DIAGNOSIS — E03.9 HYPOTHYROIDISM, UNSPECIFIED TYPE: ICD-10-CM

## 2022-12-27 LAB
CORTIS SERPL-MCNC: 7.7 UG/DL
T3FREE SERPL-MCNC: 3.4 PG/ML (ref 2–4.4)
T4 FREE SERPL-MCNC: 1.59 NG/DL (ref 0.9–1.7)
TSH SERPL DL<=0.005 MIU/L-ACNC: 0.23 UIU/ML (ref 0.3–4.2)

## 2022-12-27 PROCEDURE — 82533 TOTAL CORTISOL: CPT

## 2022-12-27 PROCEDURE — 84443 ASSAY THYROID STIM HORMONE: CPT

## 2022-12-27 PROCEDURE — 84481 FREE ASSAY (FT-3): CPT

## 2022-12-27 PROCEDURE — 82627 DEHYDROEPIANDROSTERONE: CPT

## 2022-12-27 PROCEDURE — 84439 ASSAY OF FREE THYROXINE: CPT

## 2022-12-27 PROCEDURE — 36415 COLL VENOUS BLD VENIPUNCTURE: CPT

## 2022-12-28 LAB — DHEA-S SERPL-MCNC: 64 UG/DL (ref 80–560)

## 2023-01-02 NOTE — RESULT ENCOUNTER NOTE
Dear Paco,    Here is a summary of your recent test results:  -TSH (thyroid stimulating hormone) level is near normal which indicates appropriate thyroid replacement dosing.  ADVISE: continuing same replacement dose and rechecking this in 6 months.  -Normal morning cortisol level.      For additional lab test information, www.testing.com is a very good reference.    In addition, here is a list of due or overdue Health Maintenance reminders:    Yearly Preventive Visit due on 06/24/2011  Zoster (Shingles) Vaccine(1 of 2) Never done  Complete Blood Count due on 12/21/2021  Comprehensive Metabolic Panel due on 08/04/2022  Cholesterol Lab due on 08/04/2022      Please call us at [phone] (or use [employer]) to address the above recommendations if needed.           Thank you very much for trusting me and [facility].     Have a peaceful day.    Healthy regards,  José Miguel Gaviria MD

## 2023-03-25 ENCOUNTER — HEALTH MAINTENANCE LETTER (OUTPATIENT)
Age: 58
End: 2023-03-25

## 2023-06-02 ENCOUNTER — MYC MEDICAL ADVICE (OUTPATIENT)
Dept: FAMILY MEDICINE | Facility: CLINIC | Age: 58
End: 2023-06-02
Payer: COMMERCIAL

## 2023-06-02 DIAGNOSIS — R53.83 OTHER FATIGUE: Primary | ICD-10-CM

## 2023-06-02 DIAGNOSIS — E03.9 HYPOTHYROIDISM, UNSPECIFIED TYPE: ICD-10-CM

## 2023-06-02 DIAGNOSIS — Z13.220 SCREENING FOR LIPID DISORDERS: ICD-10-CM

## 2023-06-02 DIAGNOSIS — I10 ESSENTIAL HYPERTENSION WITH GOAL BLOOD PRESSURE LESS THAN 140/90: ICD-10-CM

## 2023-06-02 DIAGNOSIS — Z12.5 SCREENING FOR MALIGNANT NEOPLASM OF PROSTATE: ICD-10-CM

## 2023-06-14 ENCOUNTER — MYC MEDICAL ADVICE (OUTPATIENT)
Dept: FAMILY MEDICINE | Facility: CLINIC | Age: 58
End: 2023-06-14
Payer: COMMERCIAL

## 2023-06-14 DIAGNOSIS — E03.9 HYPOTHYROIDISM, UNSPECIFIED TYPE: ICD-10-CM

## 2023-06-14 DIAGNOSIS — Z13.1 SCREENING FOR DIABETES MELLITUS: Primary | ICD-10-CM

## 2023-06-14 NOTE — TELEPHONE ENCOUNTER
Appointments in Next Year      Jun 16, 2023  7:45 AM  Lab visit with RV LAB  [facility] ([facility] ) [phone]     Jul 10, 2023  7:00 AM  (Arrive by 6:45 AM)  Provider Visit with Sonu Gaviria MD  [facility] ([facility] ) [phone]          Routing refill request to provider for review/approval because:   Thyroid Protocol Failed 06/14/2023 08:29 AM   Protocol Details  Recent (12 mo) or future (30 days) visit within the authorizing provider's specialty    Normal TSH on file in past 12 months          Stacy Tipton RN, BSN  [facility]

## 2023-06-16 ENCOUNTER — LAB (OUTPATIENT)
Dept: LAB | Facility: CLINIC | Age: 58
End: 2023-06-16
Payer: COMMERCIAL

## 2023-06-16 DIAGNOSIS — Z13.1 SCREENING FOR DIABETES MELLITUS: ICD-10-CM

## 2023-06-16 DIAGNOSIS — Z12.5 SCREENING FOR MALIGNANT NEOPLASM OF PROSTATE: ICD-10-CM

## 2023-06-16 DIAGNOSIS — E03.9 HYPOTHYROIDISM, UNSPECIFIED TYPE: ICD-10-CM

## 2023-06-16 DIAGNOSIS — I10 ESSENTIAL HYPERTENSION WITH GOAL BLOOD PRESSURE LESS THAN 140/90: ICD-10-CM

## 2023-06-16 DIAGNOSIS — R53.83 OTHER FATIGUE: ICD-10-CM

## 2023-06-16 DIAGNOSIS — Z13.220 SCREENING FOR LIPID DISORDERS: ICD-10-CM

## 2023-06-16 LAB
ALBUMIN SERPL BCG-MCNC: 4.7 G/DL (ref 3.5–5.2)
ALP SERPL-CCNC: 94 U/L (ref 40–129)
ALT SERPL W P-5'-P-CCNC: 27 U/L (ref 0–70)
ANION GAP SERPL CALCULATED.3IONS-SCNC: 11 MMOL/L (ref 7–15)
AST SERPL W P-5'-P-CCNC: 18 U/L (ref 0–45)
BILIRUB SERPL-MCNC: 0.3 MG/DL
BUN SERPL-MCNC: 17.7 MG/DL (ref 6–20)
CALCIUM SERPL-MCNC: 9.9 MG/DL (ref 8.6–10)
CHLORIDE SERPL-SCNC: 104 MMOL/L (ref 98–107)
CHOLEST SERPL-MCNC: 233 MG/DL
CORTIS SERPL-MCNC: 10.9 UG/DL
CREAT SERPL-MCNC: 1.09 MG/DL (ref 0.67–1.17)
CREAT UR-MCNC: 32.9 MG/DL
DEPRECATED HCO3 PLAS-SCNC: 27 MMOL/L (ref 22–29)
ERYTHROCYTE [DISTWIDTH] IN BLOOD BY AUTOMATED COUNT: 13.4 % (ref 10–15)
GFR SERPL CREATININE-BSD FRML MDRD: 79 ML/MIN/1.73M2
GLUCOSE SERPL-MCNC: 91 MG/DL (ref 70–99)
HCT VFR BLD AUTO: 48.9 % (ref 40–53)
HDLC SERPL-MCNC: 45 MG/DL
HGB BLD-MCNC: 16.5 G/DL (ref 13.3–17.7)
LDLC SERPL CALC-MCNC: 146 MG/DL
MCH RBC QN AUTO: 28.3 PG (ref 26.5–33)
MCHC RBC AUTO-ENTMCNC: 33.7 G/DL (ref 31.5–36.5)
MCV RBC AUTO: 84 FL (ref 78–100)
MICROALBUMIN UR-MCNC: <12 MG/L
MICROALBUMIN/CREAT UR: NORMAL MG/G{CREAT}
NONHDLC SERPL-MCNC: 188 MG/DL
PLATELET # BLD AUTO: 297 10E3/UL (ref 150–450)
POTASSIUM SERPL-SCNC: 4.6 MMOL/L (ref 3.4–5.3)
PROT SERPL-MCNC: 7.8 G/DL (ref 6.4–8.3)
PSA SERPL DL<=0.01 NG/ML-MCNC: 0.74 NG/ML (ref 0–3.5)
RBC # BLD AUTO: 5.83 10E6/UL (ref 4.4–5.9)
SODIUM SERPL-SCNC: 142 MMOL/L (ref 136–145)
T3FREE SERPL-MCNC: 3.3 PG/ML (ref 2–4.4)
T4 FREE SERPL-MCNC: 1.79 NG/DL (ref 0.9–1.7)
TRIGL SERPL-MCNC: 209 MG/DL
TSH SERPL DL<=0.005 MIU/L-ACNC: 0.73 UIU/ML (ref 0.3–4.2)
WBC # BLD AUTO: 5.2 10E3/UL (ref 4–11)

## 2023-06-16 PROCEDURE — 82043 UR ALBUMIN QUANTITATIVE: CPT

## 2023-06-16 PROCEDURE — 80053 COMPREHEN METABOLIC PANEL: CPT

## 2023-06-16 PROCEDURE — 85027 COMPLETE CBC AUTOMATED: CPT

## 2023-06-16 PROCEDURE — 82570 ASSAY OF URINE CREATININE: CPT

## 2023-06-16 PROCEDURE — G0103 PSA SCREENING: HCPCS

## 2023-06-16 PROCEDURE — 80061 LIPID PANEL: CPT

## 2023-06-16 PROCEDURE — 36415 COLL VENOUS BLD VENIPUNCTURE: CPT

## 2023-06-16 PROCEDURE — 82627 DEHYDROEPIANDROSTERONE: CPT

## 2023-06-16 PROCEDURE — 83036 HEMOGLOBIN GLYCOSYLATED A1C: CPT

## 2023-06-16 PROCEDURE — 84481 FREE ASSAY (FT-3): CPT

## 2023-06-16 PROCEDURE — 84443 ASSAY THYROID STIM HORMONE: CPT

## 2023-06-16 PROCEDURE — 84439 ASSAY OF FREE THYROXINE: CPT

## 2023-06-16 PROCEDURE — 82533 TOTAL CORTISOL: CPT

## 2023-06-16 RX ORDER — LEVOTHYROXINE SODIUM 175 UG/1
TABLET ORAL
Qty: 90 TABLET | Refills: 1 | Status: SHIPPED | OUTPATIENT
Start: 2023-06-16 | End: 2023-11-13

## 2023-06-16 NOTE — TELEPHONE ENCOUNTER
DSTLD message sent     Labs were drawn already 6/16/23 am   Per lab-A1C can be added up to 7 days later-     lab will need to be notified after order is placed so they can release the order and process the lab.     No results found for: A1C    Thank you.   Maria Isabel QUIJANO RN   [facility] Triage

## 2023-06-19 LAB — DHEA-S SERPL-MCNC: 57 UG/DL (ref 80–560)

## 2023-06-21 LAB — HBA1C MFR BLD: 5.4 % (ref 0–5.6)

## 2023-06-21 NOTE — RESULT ENCOUNTER NOTE
Dear Paco,    Here is a summary of your recent test results:  -Normal red blood cell (hgb) levels, normal white blood cell count and normal platelet levels.  -LDL(bad) cholesterol level is elevated, and your triglycerides are elevated which can increase your heart disease risk.  A diet high in fat and simple carbohydrates, genetics and being overweight can contribute to this. ADVISE: exercising 150 minutes of aerobic exercise per week (30 minutes for 5 days per week or 50 minutes for 3 days per week are options), eating a low saturated fat/low carbohydrate diet, and omega-3 fatty acids (fish oil) 1175-4250 mg daily are helpful to improve this. In 3 months, you should recheck your fasting cholesterol panel by scheduling a lab-only appointment.  -Liver and gallbladder tests are normal (ALT,AST, Alk phos, bilirubin), kidney function is normal (Cr, GFR), sodium is normal, potassium is normal, calcium is normal, glucose is normal.  -TSH (thyroid stimulating hormone) level is normal which indicates appropriate thyroid replacement dosing.  ADVISE: continuing same replacement dose and rechecking this in 12 months.  -Microalbumin (urine protein) test is normal.  ADVISE: rechecking this annually.    For additional lab test information, www.[email or web address].com is a very good reference.    In addition, here is a list of due or overdue Health Maintenance reminders:  Discuss Advance Care Planning Never done  Yearly Preventive Visit due on 06/24/2011  Zoster (Shingles) Vaccine(1 of 2) Never done  COVID-19 Vaccine(3 - Booster for Valeriano series) due on 01/28/2022  ANNUAL REVIEW OF HM ORDERS due on 08/09/2022  PHQ-2 (once per calendar year) due on 01/01/2023    Please call us at [phone] (or use [employer]) to address the above recommendations if needed.           Thank you very much for trusting me and [facility].     Have a peaceful day.    Healthy regards,  José Miguel Gaviria MD

## 2023-06-23 NOTE — RESULT ENCOUNTER NOTE
Dear Paco,    Here is a summary of your recent test results:  -A1C (diabetic test) is normal and indicates that your blood sugar has been in a normal range the last 3 months.    For additional lab test information, www.testing.com is a very good reference.    In addition, here is a list of due or overdue Health Maintenance reminders:  Discuss Advance Care Planning Never done  Yearly Preventive Visit due on 06/24/2011  Zoster (Shingles) Vaccine(1 of 2) Never done  COVID-19 Vaccine(3 - Booster for Valeriano series) due on 01/28/2022  ANNUAL REVIEW OF HM ORDERS due on 08/09/2022  PHQ-2 (once per calendar year) due on 01/01/2023    Please call us at [phone] (or use [employer]) to address the above recommendations if needed.           Thank you very much for trusting me and [facility].     Have a peaceful day.    Healthy regards,  José Miguel Gaviria MD

## 2023-11-13 ENCOUNTER — OFFICE VISIT (OUTPATIENT)
Dept: FAMILY MEDICINE | Facility: CLINIC | Age: 58
End: 2023-11-13
Payer: COMMERCIAL

## 2023-11-13 VITALS
SYSTOLIC BLOOD PRESSURE: 122 MMHG | OXYGEN SATURATION: 97 % | HEART RATE: 81 BPM | HEIGHT: 73 IN | RESPIRATION RATE: 12 BRPM | BODY MASS INDEX: 31.01 KG/M2 | TEMPERATURE: 97.8 F | WEIGHT: 234 LBS | DIASTOLIC BLOOD PRESSURE: 82 MMHG

## 2023-11-13 DIAGNOSIS — F41.1 GENERALIZED ANXIETY DISORDER: ICD-10-CM

## 2023-11-13 DIAGNOSIS — L70.9 ACNE, UNSPECIFIED ACNE TYPE: ICD-10-CM

## 2023-11-13 DIAGNOSIS — L71.9 ROSACEA: ICD-10-CM

## 2023-11-13 DIAGNOSIS — E03.9 HYPOTHYROIDISM, UNSPECIFIED TYPE: Primary | ICD-10-CM

## 2023-11-13 DIAGNOSIS — R13.10 DYSPHAGIA, UNSPECIFIED TYPE: ICD-10-CM

## 2023-11-13 DIAGNOSIS — K20.90 ESOPHAGITIS DETERMINED BY ENDOSCOPY: ICD-10-CM

## 2023-11-13 DIAGNOSIS — R53.83 OTHER FATIGUE: ICD-10-CM

## 2023-11-13 LAB
CORTIS SERPL-MCNC: 13.6 UG/DL
TSH SERPL DL<=0.005 MIU/L-ACNC: 1.29 UIU/ML (ref 0.3–4.2)

## 2023-11-13 PROCEDURE — 82627 DEHYDROEPIANDROSTERONE: CPT | Performed by: FAMILY MEDICINE

## 2023-11-13 PROCEDURE — 84443 ASSAY THYROID STIM HORMONE: CPT | Performed by: FAMILY MEDICINE

## 2023-11-13 PROCEDURE — 36415 COLL VENOUS BLD VENIPUNCTURE: CPT | Performed by: FAMILY MEDICINE

## 2023-11-13 PROCEDURE — 82533 TOTAL CORTISOL: CPT | Performed by: FAMILY MEDICINE

## 2023-11-13 PROCEDURE — 99214 OFFICE O/P EST MOD 30 MIN: CPT | Performed by: FAMILY MEDICINE

## 2023-11-13 RX ORDER — LEVOTHYROXINE SODIUM 175 UG/1
175 TABLET ORAL DAILY
Qty: 90 TABLET | Refills: 3 | Status: SHIPPED | OUTPATIENT
Start: 2023-11-13

## 2023-11-13 RX ORDER — MINOCYCLINE HYDROCHLORIDE 100 MG/1
100 CAPSULE ORAL DAILY
Qty: 60 CAPSULE | Refills: 1 | Status: SHIPPED | OUTPATIENT
Start: 2023-11-13

## 2023-11-13 RX ORDER — PROPRANOLOL HYDROCHLORIDE 40 MG/1
40 TABLET ORAL 2 TIMES DAILY PRN
Qty: 90 TABLET | Refills: 0 | Status: SHIPPED | OUTPATIENT
Start: 2023-11-13

## 2023-11-13 ASSESSMENT — PAIN SCALES - GENERAL: PAINLEVEL: NO PAIN (0)

## 2023-11-13 NOTE — PROGRESS NOTES
Assessment & Plan   Hypothyroidism, unspecified type  Continue taking medications as prescribed. T4 was slightly elevated but does not elicit concern or necessity to change medication dose because of a likely physiologic change such as poor sleep or change in diet that could cause the slight elevation. His TSH levels are normal, suggesting that his body is utilizing the levothyroxine and balancing TSH, T3 and T4 levels. Will check thyroid levels today to ensure the regime is appropriate.  - levothyroxine (SYNTHROID/LEVOTHROID) 175 MCG tablet  Dispense: 90 tablet; Refill: 3  - TSH with free T4 reflex    Acne, unspecified acne type  Discussed good skin hygiene such as washing face each day. Paco is currently washing his face with his shampoo which can strip away the natural oils of the face and cause damage to the skin which can lead to acne. Cetaphil (nonscented) face wash is a good option, and he should make sure to moisturizer at least once a day to help with skin damage and repair dry skin. Mositurizing the face can help treat acne flare ups in dry skin, but make sure not to layer moisturizer too thick. A good facial moisturizer is Cetaphil or Eucerin for face and Aquaphor for the body.  - minocycline (MINOCIN) 100 MG capsule  Dispense: 60 capsule; Refill: 1    Esophagitis determined by endoscopy  Dysphagia, unspecified type  Alter diet, stay away from dried foods such as chicken, pork, and bread unless cut into very small pieces. Reduce how much you are eating in one sitting. Reduce or eliminate visits to fast food places that include greasy, fatty foods such as [employer]. Eliminate or greatly reduce diet mountain dew intake to help with reflux.  PPI may be helpful if symptoms do not resolve and follow-up EGD if needed (worsening symptoms.)  -Follow up with gastroenterology if reflux or dysphagia worsens    Generalized anxiety disorder  - propranolol (INDERAL) 40 MG tablet  Dispense: 90 tablet; Refill:  0-helpful and uses intermittently for performance anxiety.    Other fatigue  Fatigue is most likely due to lack of sleep as Paco has been waking up around 4 in the morning. It is dark outside at this time and it reduces his exposure to natural light. Suggested LED clock to help with waking up in the mornings feeling more refreshed. The slow onset of light from the LED clock can mimic the sun rising providing a more natural rise from sleep. LED exposure that can improve morning fatigue and energy levels for the day. Less likely his fatigue is caused by low DHEA or cortisol levels. Will make sure these are functioning normally with labs today.  - Cortisol  - DHEA sulfate      Return in about 6 months (around 5/13/2024) for recheck as needed.      Pierregarth Jacobs MS3,  has participated in the care of this patient.     Provider Disclosure:  I agree with above History, Review of Systems, Physical exam and Plan.  I have reviewed the content of the documentation and have edited it as needed. I have personally performed the services documented here and the documentation accurately represents those services and the decisions I have made.      Electronically signed by:          José Miguel Gaviria M.D.     [address] [address]  [email or web address]   Office: [phone]       Subjective   Paco is a 58 year old, presenting for the following health issues:  Recheck Medication      History of Present Illness       Hypothyroidism:     Since last visit, patient describes the following symptoms::  Dry skin and Hair loss    He eats 0-1 servings of fruits and vegetables daily.He consumes 0 sweetened beverage(s) daily.He exercises with enough effort to increase his heart rate 10 to 19 minutes per day.  He exercises with enough effort to increase his heart rate 3 or less days per week.   He is taking medications regularly.     He has a past medical history of hypothyroidism, esophagitis,  "and adult acne. He presents today with concerns for adrenal function and thyroid levels. His lab results 5 months ago displayed normal TSH, low DHEA and slightly high T4 (1.79, normal is <1.70). Additionally, his cholesterol and LDL were elevated which prompted a conversation on healthy diet habits.    Paco's hypothyroidism is being treated with levothyroxine. He is concerned that he may be on too high of a dose or that his adrenal function could be attributing to fatigue and thyroid symptoms. He said he did visit with his endocrinologist earlier this year and they were unable to identify any contributing factors in his hormones that could be causing his symptoms. He has never completed a sleep study, however he mentioned how his wife has never noticed him stop breathing in his sleep and rarely complains about snoring    Additionally, Paco continues to have problems with his esophagitis. Just last week he had to leave a restaurant he and his wife were visiting to vomit due to dysphagia discomfort. He does not take omeprazole or pantoprazole at this time. He is not currently experiencing pain. He was offered an endoscopy referral but he declined. He admitted to poor diet choices that trigger the reflux and he thinks he needs to make those changes on his own before getting another scope or balloon treatment.    We discussed his facial acne as well, which has diminished and is being treated with minocycline. Paco feels the medication has been helpful. We discussed facial wash and moisturizer options that can help with acne and dry skin.    Review of Systems   Constitutional, HEENT, cardiovascular, pulmonary, GI, , musculoskeletal, neuro, skin, endocrine and psych systems are negative, except as otherwise noted.      Objective    /82   Pulse 81   Temp 97.8  F (36.6  C) (Tympanic)   Resp 12   Ht 1.854 m (6' 1\")   Wt 106.1 kg (234 lb)   SpO2 97%   BMI 30.87 kg/m    Body mass index is 30.87 " kg/m .  Physical Exam   GENERAL: no acute distress  EYES: Conjunctiva are not injected, no discharge.  EARS: Left TM -no erythema, no effusion,  not bulged.               Right TM -no erythema, no effusion,  not bulged.  NOSE: no discharge, no sinus tenderness  THROAT: no erythema, no exudate, no lesions  NECK: supple, no adenopathy.  CARDIAC: regular rate and rhythm, no murmur  RESP: clear, no wheezing, no rales, no rhonchi  ABD: soft, no distension, no tenderness  SKIN: No rashes

## 2023-11-14 LAB — DHEA-S SERPL-MCNC: 52 UG/DL (ref 80–560)

## 2023-11-15 NOTE — RESULT ENCOUNTER NOTE
Dear Paco,    Here is a summary of your recent test results:  -All of your labs are normal except a slight decreased DHEA    For additional lab test information, [email or web address] is a very good reference.    In addition, here is a list of due or overdue Health Maintenance reminders:  Discuss Advance Care Planning Never done  Yearly Preventive Visit due on 06/24/2011  Zoster (Shingles) Vaccine(1 of 2) Never done  Flu Vaccine(1) due on 09/01/2023  COVID-19 Vaccine(3 - 2023-24 season) due on 09/01/2023    Please call us at [phone] (or use [employer]) to address the above recommendations if needed.           Thank you very much for trusting me and [facility].     Have a peaceful day.    Healthy regards,  José Miguel Gaviria MD

## 2023-12-07 ENCOUNTER — MYC REFILL (OUTPATIENT)
Dept: FAMILY MEDICINE | Facility: CLINIC | Age: 58
End: 2023-12-07
Payer: COMMERCIAL

## 2023-12-07 DIAGNOSIS — E03.9 HYPOTHYROIDISM, UNSPECIFIED TYPE: ICD-10-CM

## 2023-12-07 DIAGNOSIS — F41.1 GENERALIZED ANXIETY DISORDER: ICD-10-CM

## 2023-12-07 DIAGNOSIS — L71.9 ROSACEA: ICD-10-CM

## 2023-12-07 DIAGNOSIS — L70.9 ACNE, UNSPECIFIED ACNE TYPE: ICD-10-CM

## 2023-12-07 RX ORDER — MINOCYCLINE HYDROCHLORIDE 100 MG/1
100 CAPSULE ORAL DAILY
Qty: 60 CAPSULE | Refills: 1 | OUTPATIENT
Start: 2023-12-07

## 2023-12-07 RX ORDER — LEVOTHYROXINE SODIUM 175 UG/1
175 TABLET ORAL DAILY
Qty: 90 TABLET | Refills: 3 | OUTPATIENT
Start: 2023-12-07

## 2023-12-07 RX ORDER — PROPRANOLOL HYDROCHLORIDE 40 MG/1
40 TABLET ORAL 2 TIMES DAILY PRN
Qty: 90 TABLET | Refills: 0 | OUTPATIENT
Start: 2023-12-07

## 2024-05-25 ENCOUNTER — HEALTH MAINTENANCE LETTER (OUTPATIENT)
Age: 59
End: 2024-05-25

## 2024-09-17 ENCOUNTER — DOCUMENTATION ONLY (OUTPATIENT)
Dept: FAMILY MEDICINE | Facility: CLINIC | Age: 59
End: 2024-09-17
Payer: COMMERCIAL

## 2024-09-17 DIAGNOSIS — E03.9 HYPOTHYROIDISM, UNSPECIFIED TYPE: Primary | ICD-10-CM

## 2024-09-17 DIAGNOSIS — R53.83 OTHER FATIGUE: ICD-10-CM

## 2024-09-17 NOTE — PROGRESS NOTES
Pt has lab visit next week for Testing for: Cortisol, DHEA, Free T3, Free T4 with TSH , need orders

## 2024-09-24 ENCOUNTER — LAB (OUTPATIENT)
Dept: LAB | Facility: CLINIC | Age: 59
End: 2024-09-24
Payer: COMMERCIAL

## 2024-09-24 DIAGNOSIS — Z12.5 SCREENING FOR PROSTATE CANCER: ICD-10-CM

## 2024-09-24 DIAGNOSIS — E03.9 HYPOTHYROIDISM, UNSPECIFIED TYPE: ICD-10-CM

## 2024-09-24 DIAGNOSIS — E78.5 HYPERLIPIDEMIA LDL GOAL <160: ICD-10-CM

## 2024-09-24 DIAGNOSIS — I10 ESSENTIAL HYPERTENSION WITH GOAL BLOOD PRESSURE LESS THAN 140/90: Primary | ICD-10-CM

## 2024-09-24 DIAGNOSIS — R53.83 OTHER FATIGUE: ICD-10-CM

## 2024-09-24 LAB
ALBUMIN SERPL BCG-MCNC: 4.6 G/DL (ref 3.5–5.2)
ALP SERPL-CCNC: 90 U/L (ref 40–150)
ALT SERPL W P-5'-P-CCNC: 30 U/L (ref 0–70)
ANION GAP SERPL CALCULATED.3IONS-SCNC: 13 MMOL/L (ref 7–15)
AST SERPL W P-5'-P-CCNC: 20 U/L (ref 0–45)
BILIRUB SERPL-MCNC: 0.3 MG/DL
BUN SERPL-MCNC: 15 MG/DL (ref 6–20)
CALCIUM SERPL-MCNC: 9.7 MG/DL (ref 8.8–10.4)
CHLORIDE SERPL-SCNC: 104 MMOL/L (ref 98–107)
CHOLEST SERPL-MCNC: 253 MG/DL
CORTIS SERPL-MCNC: 14.5 UG/DL
CREAT SERPL-MCNC: 0.9 MG/DL (ref 0.67–1.17)
EGFRCR SERPLBLD CKD-EPI 2021: >90 ML/MIN/1.73M2
ERYTHROCYTE [DISTWIDTH] IN BLOOD BY AUTOMATED COUNT: 13.3 % (ref 10–15)
FASTING STATUS PATIENT QL REPORTED: YES
FASTING STATUS PATIENT QL REPORTED: YES
GLUCOSE SERPL-MCNC: 116 MG/DL (ref 70–99)
HCO3 SERPL-SCNC: 22 MMOL/L (ref 22–29)
HCT VFR BLD AUTO: 45.8 % (ref 40–53)
HDLC SERPL-MCNC: 43 MG/DL
HGB BLD-MCNC: 15.8 G/DL (ref 13.3–17.7)
LDLC SERPL CALC-MCNC: 147 MG/DL
MCH RBC QN AUTO: 29.1 PG (ref 26.5–33)
MCHC RBC AUTO-ENTMCNC: 34.5 G/DL (ref 31.5–36.5)
MCV RBC AUTO: 84 FL (ref 78–100)
NONHDLC SERPL-MCNC: 210 MG/DL
PLATELET # BLD AUTO: 313 10E3/UL (ref 150–450)
POTASSIUM SERPL-SCNC: 4.4 MMOL/L (ref 3.4–5.3)
PROT SERPL-MCNC: 7.4 G/DL (ref 6.4–8.3)
PSA SERPL DL<=0.01 NG/ML-MCNC: 0.79 NG/ML (ref 0–3.5)
RBC # BLD AUTO: 5.43 10E6/UL (ref 4.4–5.9)
SODIUM SERPL-SCNC: 139 MMOL/L (ref 135–145)
T3FREE SERPL-MCNC: 3.3 PG/ML (ref 2–4.4)
T4 FREE SERPL-MCNC: 1.66 NG/DL (ref 0.9–1.7)
TRIGL SERPL-MCNC: 316 MG/DL
TSH SERPL DL<=0.005 MIU/L-ACNC: 1.27 UIU/ML (ref 0.3–4.2)
WBC # BLD AUTO: 5.5 10E3/UL (ref 4–11)

## 2024-09-24 PROCEDURE — 82627 DEHYDROEPIANDROSTERONE: CPT

## 2024-09-24 PROCEDURE — G0103 PSA SCREENING: HCPCS

## 2024-09-24 PROCEDURE — 80053 COMPREHEN METABOLIC PANEL: CPT

## 2024-09-24 PROCEDURE — 84439 ASSAY OF FREE THYROXINE: CPT

## 2024-09-24 PROCEDURE — 85027 COMPLETE CBC AUTOMATED: CPT

## 2024-09-24 PROCEDURE — 84443 ASSAY THYROID STIM HORMONE: CPT

## 2024-09-24 PROCEDURE — 84481 FREE ASSAY (FT-3): CPT

## 2024-09-24 PROCEDURE — 36415 COLL VENOUS BLD VENIPUNCTURE: CPT

## 2024-09-24 PROCEDURE — 82533 TOTAL CORTISOL: CPT

## 2024-09-24 PROCEDURE — 80061 LIPID PANEL: CPT

## 2024-09-25 LAB — DHEA-S SERPL-MCNC: 48 UG/DL (ref 80–560)

## 2024-09-26 NOTE — RESULT ENCOUNTER NOTE
Deamarilia Antonio,    Here is a summary of your recent test results:  -Normal red blood cell (hgb) levels, normal white blood cell count and normal platelet levels.  -PSA (prostate specific antigen) test is normal.  This indicates a low likelihood of prostate cancer.  ADVISE: rechecking this in 1 year.  -Lipid panel: The ASCVD risk score returns the percentage likelihood of a first time Atherosclerotic Cardiovascular Disease (ASCVD) event.    The 10-year ASCVD risk score (Chandler BENÍTEZ, et al., 2019) is: 11.1%    Values used to calculate the score:      Age: 59 years      Sex: Male      Is Non- : No      Diabetic: No      Tobacco smoker: No      Systolic Blood Pressure: 122 mmHg      Is BP treated: Yes      HDL Cholesterol: 43 mg/dL      Total Cholesterol: 253 mg/dL    -11.1% of patients that have a similar cholesterol profile to you will have a stroke, heart attack or death (related to heart disease) within the next 10 years and that is considered a high risk and cholesterol lowering medications are  recommended at this time (high risk is >10%, or >7.5% if other risk factors such has high blood pressure or other heart disease risk factors).    Please let me know if you be okay with starting a cholesterol lowering medication which is recommended such as rosuvastatin 5 mg daily.    -LDL(bad) cholesterol level is elevated, and your triglycerides are elevated which can increase your heart disease risk.  A diet high in fat and simple carbohydrates, genetics and being overweight can contribute to this. ADVISE: exercising 150 minutes of aerobic exercise per week (30 minutes for 5 days per week or 50 minutes for 3 days per week are options), eating a low saturated fat/low carbohydrate diet, and omega-3 fatty acids (fish oil) 3503-5423 mg daily are helpful to improve this.  -Liver and gallbladder tests (ALT,AST, Alk phos,bilirubin) are normal.  -Kidney function (GFR) is normal.  -Sodium is normal.  -Potassium  is normal.  -Calcium is normal.  -Glucose is slight elevated and may be a sign of early diabetes (prediabetes). ADVISE:: eating a low carbohydrate diet, exercising, trying to lose weight (if necessary) and rechecking your glucose level in 12 months.  -TSH (thyroid stimulating hormone) level is normal which indicates appropriate thyroid replacement dosing.  ADVISE: continuing same replacement dose and rechecking this in 12 months.  -Cortisol level is normal.  -DHEA level is decreased and this is unclear clinical significance.    For additional lab test information, [email or web address] is a very good reference.    In addition, here is a list of due or overdue Health Maintenance reminders:  Discuss Advance Care Planning Never done  Yearly Preventive Visit due on 06/24/2011  Zoster (Shingles) Vaccine(1 of 2) Never done  PHQ-2 (once per calendar year) due on 01/01/2024  Diptheria Tetanus Pertussis (DTAP/TDAP/TD) Vaccine(2 - Td or Tdap) due on 07/25/2024  Flu Vaccine(1) due on 09/01/2024  COVID-19 Vaccine(3 - 2024-25 season) due on 09/01/2024    Please call us at [phone] (or use [employer]) to address the above recommendations if needed.           Thank you very much for trusting me and [facility].     Have a peaceful day.    Healthy regards,  José Miguel Gaviria MD

## 2024-10-15 ENCOUNTER — MYC MEDICAL ADVICE (OUTPATIENT)
Dept: FAMILY MEDICINE | Facility: CLINIC | Age: 59
End: 2024-10-15
Payer: COMMERCIAL

## 2024-12-07 ENCOUNTER — MYC REFILL (OUTPATIENT)
Dept: FAMILY MEDICINE | Facility: CLINIC | Age: 59
End: 2024-12-07
Payer: COMMERCIAL

## 2024-12-07 DIAGNOSIS — E03.9 HYPOTHYROIDISM, UNSPECIFIED TYPE: ICD-10-CM

## 2024-12-09 RX ORDER — LEVOTHYROXINE SODIUM 175 UG/1
175 TABLET ORAL DAILY
Qty: 90 TABLET | Refills: 0 | Status: SHIPPED | OUTPATIENT
Start: 2024-12-09

## 2025-02-11 ENCOUNTER — OFFICE VISIT (OUTPATIENT)
Dept: FAMILY MEDICINE | Facility: CLINIC | Age: 60
End: 2025-02-11
Payer: COMMERCIAL

## 2025-02-11 ENCOUNTER — MYC MEDICAL ADVICE (OUTPATIENT)
Dept: FAMILY MEDICINE | Facility: CLINIC | Age: 60
End: 2025-02-11

## 2025-02-11 VITALS
RESPIRATION RATE: 18 BRPM | BODY MASS INDEX: 32.56 KG/M2 | OXYGEN SATURATION: 99 % | WEIGHT: 245.7 LBS | SYSTOLIC BLOOD PRESSURE: 125 MMHG | HEART RATE: 88 BPM | TEMPERATURE: 97.4 F | DIASTOLIC BLOOD PRESSURE: 92 MMHG | HEIGHT: 73 IN

## 2025-02-11 DIAGNOSIS — E66.9 OBESITY WITH SERIOUS COMORBIDITY, UNSPECIFIED CLASS, UNSPECIFIED OBESITY TYPE: ICD-10-CM

## 2025-02-11 DIAGNOSIS — E78.5 HYPERLIPIDEMIA LDL GOAL <100: ICD-10-CM

## 2025-02-11 DIAGNOSIS — F41.1 GENERALIZED ANXIETY DISORDER: ICD-10-CM

## 2025-02-11 DIAGNOSIS — L70.9 ACNE, UNSPECIFIED ACNE TYPE: ICD-10-CM

## 2025-02-11 DIAGNOSIS — R73.03 PREDIABETES: Primary | ICD-10-CM

## 2025-02-11 DIAGNOSIS — E03.9 HYPOTHYROIDISM, UNSPECIFIED TYPE: ICD-10-CM

## 2025-02-11 DIAGNOSIS — R03.0 ELEVATED BLOOD PRESSURE READING WITHOUT DIAGNOSIS OF HYPERTENSION: ICD-10-CM

## 2025-02-11 DIAGNOSIS — E66.9 OBESITY WITH SERIOUS COMORBIDITY, UNSPECIFIED CLASS, UNSPECIFIED OBESITY TYPE: Primary | ICD-10-CM

## 2025-02-11 DIAGNOSIS — L71.9 ROSACEA: ICD-10-CM

## 2025-02-11 LAB
CORTIS SERPL-MCNC: 11.8 UG/DL
TSH SERPL DL<=0.005 MIU/L-ACNC: 1.07 UIU/ML (ref 0.3–4.2)

## 2025-02-11 PROCEDURE — 84443 ASSAY THYROID STIM HORMONE: CPT | Performed by: FAMILY MEDICINE

## 2025-02-11 PROCEDURE — 90472 IMMUNIZATION ADMIN EACH ADD: CPT | Performed by: FAMILY MEDICINE

## 2025-02-11 PROCEDURE — 99214 OFFICE O/P EST MOD 30 MIN: CPT | Mod: 25 | Performed by: FAMILY MEDICINE

## 2025-02-11 PROCEDURE — 36415 COLL VENOUS BLD VENIPUNCTURE: CPT | Performed by: FAMILY MEDICINE

## 2025-02-11 PROCEDURE — 90677 PCV20 VACCINE IM: CPT | Performed by: FAMILY MEDICINE

## 2025-02-11 PROCEDURE — 90715 TDAP VACCINE 7 YRS/> IM: CPT | Performed by: FAMILY MEDICINE

## 2025-02-11 PROCEDURE — 82533 TOTAL CORTISOL: CPT | Performed by: FAMILY MEDICINE

## 2025-02-11 PROCEDURE — 90471 IMMUNIZATION ADMIN: CPT | Performed by: FAMILY MEDICINE

## 2025-02-11 PROCEDURE — 82627 DEHYDROEPIANDROSTERONE: CPT | Performed by: FAMILY MEDICINE

## 2025-02-11 PROCEDURE — G2211 COMPLEX E/M VISIT ADD ON: HCPCS | Performed by: FAMILY MEDICINE

## 2025-02-11 RX ORDER — METFORMIN HYDROCHLORIDE 500 MG/1
500-2000 TABLET, EXTENDED RELEASE ORAL
Qty: 270 TABLET | Refills: 1 | Status: SHIPPED | OUTPATIENT
Start: 2025-02-11

## 2025-02-11 RX ORDER — TIRZEPATIDE 5 MG/.5ML
5 INJECTION, SOLUTION SUBCUTANEOUS
Qty: 2 ML | Refills: 1 | Status: SHIPPED | OUTPATIENT
Start: 2025-03-11

## 2025-02-11 RX ORDER — LEVOTHYROXINE SODIUM 175 UG/1
175 TABLET ORAL DAILY
Qty: 90 TABLET | Refills: 4 | Status: SHIPPED | OUTPATIENT
Start: 2025-02-11

## 2025-02-11 RX ORDER — MINOCYCLINE HYDROCHLORIDE 100 MG/1
100 CAPSULE ORAL DAILY
Qty: 60 CAPSULE | Refills: 3 | Status: SHIPPED | OUTPATIENT
Start: 2025-02-11

## 2025-02-11 RX ORDER — PROPRANOLOL HYDROCHLORIDE 40 MG/1
40 TABLET ORAL 2 TIMES DAILY PRN
Qty: 90 TABLET | Refills: 0 | Status: SHIPPED | OUTPATIENT
Start: 2025-02-11

## 2025-02-11 RX ORDER — TIRZEPATIDE 2.5 MG/.5ML
2.5 INJECTION, SOLUTION SUBCUTANEOUS
Qty: 2 ML | Refills: 0 | Status: SHIPPED | OUTPATIENT
Start: 2025-02-11 | End: 2025-03-11

## 2025-02-11 ASSESSMENT — PAIN SCALES - GENERAL: PAINLEVEL_OUTOF10: NO PAIN (0)

## 2025-02-11 NOTE — PATIENT INSTRUCTIONS
Eat Better ? Move More ? Live Well     Eat 3 nutrient-rich meals each day    Don t skip meals--it will cause you to overeat later in the day!    Eating fiber (vegetables/fruits/whole grains) and protein with meals helps you stay full longer    Choose foods with less than 10 grams of sugar and 5 grams of fat per serving to prevent excess calories and weight re-gain  Eat around the same times each day to develop a routine eating schedule   Avoid snacking unless physically hungry.   Planned snacks: 1-2 times per day and no more than 150 calories    Eat protein first   Protein helps with healing, maintaining adequate muscle mass, reducing hunger and optimizing nutritional status   Aim for 60-80 grams of protein per day   Fill up on Fiber   Fiber comes from plants--fruits, veggies, whole grains, nuts/seeds and beans   Fiber is low in calories, high in phytonutrients and helps you stay full longer   Aim for 25-35 grams per day by eating fiber with meals and snacks  Eat [id number]   Take 20-30 minutes to eat each meal by taking small bites, chewing foods to applesauce consistency or 20-30 times before you swallow   Eating foods too fast can delay satiety/fullness signals and increase overeating   Slow down your eating by using toddler utensils, putting your fork/spoon down between bites and not watching TV or emailing during meals!   Keep a Journal         Writing down what you eat, how you feel and when you are active helps you identify new changes to work on from week to week         Look for ways to cut 100 calories from your current diet 2-3 times per day  Drink 64 ounces of 0-Calorie drinks between meals   Water   Zero calorie Propel  or Vitamin Water     [employer]  Zero Calories   Crystal Light , Sugar-Free Nasir-Aid , and other sugar-free lemonade or flavored hurtado   Keep Caffeine to less than 300mg per day ie: 3-6oz cups coffee      Work up to 45-60 minutes of physical activity most days of the week   Helps  with losing weight and prevent regaining those extra pounds!    Do a combo of cardio (walking/water exercises) and strength training (lifting weights/Vinyasa yoga)     Avoid Mindless Eating   Be present when you eat--take note of the smell, taste and quality of your food   Make a list of alternative activities you could do to prevent eating out of boredom/stress  Go for a walk, call a friend, chew gum, paint your nails, re-organize the garage, etc          Semaglutide that is available through the [facility]    -Currently, we offer prefilled syringes in 0.25mg. 0.5mg, 1mg, 1.5mg, 2mg, and 2.5mg (they will dispense both needles and the drug, no need for an extra rx)  -Self-inject the medicine:  Subcutaneous Injection  Pinch the injection site to create a firm surface.  Alternate injection sites between either side of the stomach, back of the arm, or thigh. Inject two inches from the belly button on either side.  Insert the needle into the skin between the index finger and thumb at 90 degrees.  Using a slow and constant pressure, push the plunger dawit until it reaches the bottom.  Gently pull the syringe out of the skin.  Discard of the needle in a sharps container.    Keep it in the fridge, they can dispense 1 month (4 syringes) at a time  Prefilled Syringes Pricing #4 each (1month):  0.25mg ~$222  0.5mg ~$260  1mg ~$306  1.5mg ~$342  2mg ~$395  2.5mg ~$438  [facility] Compounded Semaglutide [email or web address]       To schedule a blood pressure followup appointment at a [facility] go to:   [email or web address]       Dietary Approaches to Stop Hypertension (The DASH Diet)   What is hypertension?   Hypertension is blood pressure that keeps being higher than normal. Blood pressure is the force of blood against artery walls as the heart pumps blood through the body. Blood pressure can be unhealthy if it is above 120/80. The higher your blood pressure, the greater the health risk.   High blood  pressure can be controlled if you take these steps:   Maintain a healthy weight.   Are physically active.   Follow a healthy eating plan, which includes foods that do not have a lot of salt and sodium.   Do not drink a lot of alcohol.   Diet affects high blood pressure. Following the DASH diet and reducing the amount of sodium in your diet will help lower your blood pressure. It will also help prevent high blood pressure.   What is the DASH diet?   Dietary Approaches to Stop Hypertension (DASH) is a diet that is low in saturated fat, cholesterol, and total fat. It emphasizes fruits, vegetables, and low-fat dairy foods. The DASH diet also includes whole-grain products, fish, poultry, and nuts. It encourages fewer servings of red meat, sweets, and sugar-containing beverages. It is rich in magnesium, potassium, and calcium, as well as protein and fiber.   How do I get started on the DASH diet?   The DASH diet requires no special foods and has no hard-to-follow recipes. Start by seeing how DASH compares with your current eating habits.   The DASH eating plan illustrated below is based on a diet of 2,000 calories a day. Your healthcare provider or a dietitian can help you determine how many calories a day you need. Most adults need somewhere between 1600 and 2800 calories a day. Serving sizes for different foods vary from 1/2 cup to 1 and 1/4 cups. Check product nutrition labels for serving sizes and the number of calories per serving.                      Number of        Examples of  Food Group      servings         serving size  ----------------------------------------------------------------    Grains and      7 to 8 per day   1 slice of bread  grain products                   1 cup ready-to-eat cold cereal                                   1/2 cup cooked rice, pasta,                                   or cereal    Vegetables      4 to 5 per day   1 cup raw leafy vegetable                                   1/2 cup  cooked vegetable                                   6 ounces (oz) vegetable juice      Fruits          4 to 5 per day   1 medium fruit                                   1/4 cup dried fruit                                   1/2 cup fresh, frozen, or                                   canned fruit                                   6 oz fruit juice      Low-fat or      2 to 3 per day   8 oz milk  fat-free                         1 cup yogurt  dairy foods                      1 and 1/2 oz cheese    Lean meats,  poultry,        2 or fewer per   3 oz cooked lean meat,  or fish         day              skinless poultry, or fish    Nuts, seeds,    4 to 5 per week  1/3 cup or 1 and 1/2 oz nuts  and dry beans                    1 tablespoon or 1/2 oz seeds                                   1/2 cup cooked dry beans    Fats and oils   2 to 3 per day   1 teaspoon soft margarine                                   1 tablespoon low-fat mayonnaise                                   2 tablespoons light salad                                   dressing                                   1 teaspoon vegetable oil    Sweets          5 per week       1 tablespoon sugar                                   1 tablespoon jelly or jam                                   1/2 oz jelly beans                                   8 oz lemonade  ----------------------------------------------------------------  Make changes gradually. Here are some suggestions that might help:   If you now eat 1 or 2 servings of vegetables a day, add a serving at lunch and another at dinner.   If you have not been eating fruit regularly, or have only juice at breakfast, add a serving to your meals or have it as a snack.   Drink milk or water with lunch or dinner instead of soda, sugar-sweetened tea, or alcohol. Choose low-fat (1%) or fat-free (nonfat) dairy products so that you are eating fewer calories and less saturated fat, total fat, and cholesterol.   Read food labels on  margarines and salad dressings to choose products lowest in fat.   If you now eat large portions of meat, slowly cut back--by a half or a third at each meal. Limit meat to 6 ounces a day (two 3-ounce servings). Three to 4 ounces is about the size of a deck of cards.   Have 2 or more meatless meals each week. Increase servings of vegetables, rice, pasta, and beans in all meals. Try casseroles, pasta, and stir-dior dishes, which have less meat and more vegetables, grains, and beans.   Use fruits canned in their own juice. Fresh fruits require little or no preparation. Dried fruits are a good choice to carry with you or to have ready in the car.   Try these snacks ideas: unsalted pretzels or nuts mixed with raisins, claire crackers, low-fat and fat-free yogurt or frozen yogurt, popcorn with no salt or butter added, and raw vegetables.   Choose whole-grain foods to get more nutrients, including minerals and fiber. For example, choose whole-wheat bread, whole-grain cereals, or brown rice.   Use fresh, frozen, or no-salt-added canned vegetables.   Remember to also reduce the salt and sodium in your diet. Try to have no more than 2000 milligrams (mg) of sodium per day, with a goal of further reducing the sodium to 1500 mg per day. Three important ways to reduce sodium are:   Eat food products with reduced-sodium or no salt added.   Use less salt when you prepare foods and do not add salt to your food at the table.   Read food labels. Aim for foods that contain less than 5% of the daily value of sodium.   The DASH eating plan is not designed for weight loss. But it contains many lower-calorie foods, such as fruits and vegetables. You can make it lower in calories by replacing high-calorie foods with more fruits and vegetables. Some ideas to increase fruits and vegetables and decrease calories include:   Eat a medium apple instead of 4 shortbread cookies. You'll save 80 calories.   Eat 1/4 cup of dried apricots instead of a  2-ounce bag of pork rinds. You'll save 230 calories.   Have a hamburger that's 3 ounces instead of 6 ounces. Add a 1/2 cup serving of carrots and a 1/2 cup serving of spinach. You'll save more than 200 calories.   Instead of 5 ounces of chicken, have a stir dior with 2 ounces of chicken and 1 and 1/2 cups of raw vegetables. Use just a small amount of vegetable oil. You'll save 50 calories.   Have a 1/2 cup serving of low-fat frozen yogurt instead of a 1-and-1/2-ounce chocolate bar. You'll save about 110 calories.   Use low-fat or fat-free condiments, such as fat-free salad dressings.   Eat smaller portions. Cut back gradually.   Use food labels to compare fat and calorie content in packaged foods. Items marked low fat or fat free may be lower in fat but not lower in calories than their regular versions.   Limit foods with lots of added sugar, such as pies, flavored yogurts, candy bars, ice cream, sherbet, regular soft drinks, and fruit drinks.   Drink water or club soda instead of cola or other soda drinks.   For more information, see the National Heart, Lung, and Blood [place] Web site at: http://www.nhlbi.nih.gov/health/public/heart/hbp/dash/.   Based on National Institutes of Health guidelines.   Published by [facility].   Last modified: 2008-08-11   Last reviewed: 2008-11-02

## 2025-02-11 NOTE — PROGRESS NOTES
Assessment & Plan   Hypothyroidism, unspecified type  Currently on levothyroxine with sufficient supply for the rest of the month. Labs last done in the fall. Emphasized the importance of regular lab checks to monitor thyroid function.  - Order thyroid function tests  - levothyroxine (SYNTHROID/LEVOTHROID) 175 MCG tablet  Dispense: 90 tablet; Refill: 4  - TSH with free T4 reflex  - TSH with free T4 reflex    Acne, unspecified acne type  Experiences recurrent acne and uses minocycline as needed, which clears the acne for several months.  - Refill minocycline as needed  - minocycline (MINOCIN) 100 MG capsule  Dispense: 60 capsule; Refill: 3    Perioral Dermatitis  See above  - minocycline (MINOCIN) 100 MG capsule  Dispense: 60 capsule; Refill: 3    Generalized anxiety disorder  Uses propranolol as needed for anxiety, particularly for public speaking. Reports good control of symptoms without cognitive side effects.  - Refill propranolol as needed  - propranolol (INDERAL) 40 MG tablet  Dispense: 90 tablet; Refill: 0    Obesity with serious comorbidity, unspecified class, unspecified obesity type  Weight increased to 245 lbs with a BMI of 32. Discussed weight management strategies including dietary changes, exercise, and potential pharmacotherapy. Expressed interest in GLP-1 agonist therapy, specifically semaglutide, which his wife is currently using. Discussed the cost of semaglutide, potential insurance coverage, and alternative options if not covered. Recommended lifestyle modifications including reducing fast food intake, avoiding snacking before bedtime, and intermittent fasting. Suggested no liquids 30 minutes before, during, and 30 minutes after meals to aid weight loss.  - Submit prior authorization for semaglutide  - Discuss lifestyle modifications including reducing fast food intake, avoiding snacking before bedtime, and intermittent fasting  - Recommend no liquids 30 minutes before, during, and 30 minutes  after meals  - ZEPBOUND 2.5 MG/0.5ML prefilled pen  Dispense: 2 mL; Refill: 0  - ZEPBOUND 5 MG/0.5ML prefilled pen  Dispense: 2 mL; Refill: 1    Elevated blood pressure reading without diagnosis of hypertension  Blood pressure elevated today. Does not regularly monitor blood pressure outside the clinic. Discussed potential factors such as caffeine intake. Previous prescription for lisinopril in 2011, but did not take it. Recommended monitoring and potential medication if blood pressure remains elevated.  - Schedule blood pressure follow-up with pharmacist  - Consider antihypertensive medication if blood pressure remains elevated  - Cortisol  - DHEA sulfate  - ZEPBOUND 2.5 MG/0.5ML prefilled pen  Dispense: 2 mL; Refill: 0  - ZEPBOUND 5 MG/0.5ML prefilled pen  Dispense: 2 mL; Refill: 1  - Cortisol  - DHEA sulfate    Hyperlipidemia LDL goal <100  Recent ASCVD risk score 11.1% - discussed risks of untreated hyperlipidemia and potential need for statin therapy. Hesitant to take statins. Recommended coronary calcium CT scan to assess for coronary artery disease. Discussed alternative cholesterol-lowering options if statins are not preferred or tolerated.  - Recommend coronary calcium CT scan to assess for coronary artery disease  - Discuss alternative cholesterol-lowering options if statins are not preferred  - CT Coronary Calcium Scan  - ZEPBOUND 2.5 MG/0.5ML prefilled pen  Dispense: 2 mL; Refill: 0  - ZEPBOUND 5 MG/0.5ML prefilled pen  Dispense: 2 mL; Refill: 1    - Administer tetanus vaccine  - Discuss optional pneumonia booster and shingles vaccine    Follow-up  - Follow up on blood pressure in 1-2 weeks with pharmacist  - Follow up on prior authorization for tirzepatide  - Complete lab tests for thyroid, cortisol, and DHEA  - Schedule coronary calcium CT scan at [facility] in [place].    Consent was obtained from the patient to use an AI documentation tool in the creation of this note.      Return in about 1 year  "(around 2/11/2026) for medication recheck, and/or, wellness exam with fasting labs with José Miguel Gaviria MD.    The longitudinal plan of care for the diagnosis(es)/condition(s) as documented were addressed during this visit. Due to the added complexity in care, I will continue to support Paco in the subsequent management and with ongoing continuity of care.      José Miguel Gaviria MD      [address] [address]  [employer]   Office: [phone]         Franco Antonio is a 60 year old, presenting for the following health issues:  Recheck Medication    History of Present Illness       Hypothyroidism:     Since last visit, patient describes the following symptoms::  Weight gain    Weight gain::  5 lbs.    He eats 2-3 servings of fruits and vegetables daily.He consumes 0 sweetened beverage(s) daily.He exercises with enough effort to increase his heart rate 10 to 19 minutes per day.  He exercises with enough effort to increase his heart rate 3 or less days per week.   He is taking medications regularly.    Hypothyroidism Follow-up    Since last visit, patient describes the following symptoms: weight gain of unknown lbs and hair loss    Medication Followup of Levothyroxine  Taking Medication as prescribed: yes  Side Effects:  None  Medication Helping Symptoms:  yes          Objective    BP (!) 125/92   Pulse 88   Temp 97.4  F (36.3  C) (Tympanic)   Resp 18   Ht 1.854 m (6' 1\")   Wt 111.4 kg (245 lb 11.2 oz)   SpO2 99%   BMI 32.42 kg/m    Body mass index is 32.42 kg/m .  Physical Exam   GENERAL: alert and no distress  NECK: no adenopathy, no asymmetry, masses, or scars  RESP: lungs clear to auscultation - no rales, rhonchi or wheezes  CV: regular rate and rhythm, normal S1 S2, no S3 or S4, no murmur, click or rub, no peripheral edema  ABDOMEN: soft, nontender, no hepatosplenomegaly, no masses and bowel sounds normal  MS: no gross musculoskeletal defects noted, " no edema  SKIN: no suspicious lesions or rashes  NEURO: Normal strength and tone, mentation intact and speech normal  BACK: no CVA tenderness, no paralumbar tenderness  PSYCH: mentation appears normal, affect normal/bright            Signed Electronically by: Sonu Gaviria MD

## 2025-02-12 LAB — DHEA-S SERPL-MCNC: 61 UG/DL (ref 80–560)

## 2025-02-12 NOTE — RESULT ENCOUNTER NOTE
Dear Paco,    Here is a summary of your recent test results:  -TSH (thyroid stimulating hormone) level is normal which indicates appropriate thyroid replacement dosing.  ADVISE: continuing same replacement dose and rechecking this in 12 months.  -Normal morning cortisol level.  -DHEA level continues to be slight low    For additional lab test information, [email or web address] is a very good reference.    In addition, here is a list of due or overdue Health Maintenance reminders:  Discuss Advance Care Planning Never done  Yearly Preventive Visit due on 06/24/2011  Zoster (Shingles) Vaccine(1 of 2) Never done  Flu Vaccine(1) due on 09/01/2024  COVID-19 Vaccine(3 - 2024-25 season) due on 09/01/2024    Please call us at [phone] (or use [employer]) to address the above recommendations if needed.           Thank you very much for trusting me and [facility].     Have a peaceful day.    Healthy regards,  José Miguel Gaviria MD

## 2025-03-13 DIAGNOSIS — F41.1 GENERALIZED ANXIETY DISORDER: ICD-10-CM

## 2025-03-13 RX ORDER — PROPRANOLOL HYDROCHLORIDE 40 MG/1
40 TABLET ORAL 2 TIMES DAILY PRN
Qty: 90 TABLET | Refills: 11 | Status: SHIPPED | OUTPATIENT
Start: 2025-03-13

## 2025-03-31 DIAGNOSIS — E66.9 OBESITY WITH SERIOUS COMORBIDITY, UNSPECIFIED CLASS, UNSPECIFIED OBESITY TYPE: ICD-10-CM

## 2025-03-31 DIAGNOSIS — E78.5 HYPERLIPIDEMIA LDL GOAL <100: ICD-10-CM

## 2025-03-31 DIAGNOSIS — R03.0 ELEVATED BLOOD PRESSURE READING WITHOUT DIAGNOSIS OF HYPERTENSION: ICD-10-CM

## 2025-04-01 ENCOUNTER — MYC REFILL (OUTPATIENT)
Dept: FAMILY MEDICINE | Facility: CLINIC | Age: 60
End: 2025-04-01
Payer: COMMERCIAL

## 2025-04-01 DIAGNOSIS — E78.5 HYPERLIPIDEMIA LDL GOAL <100: ICD-10-CM

## 2025-04-01 DIAGNOSIS — E66.9 OBESITY WITH SERIOUS COMORBIDITY, UNSPECIFIED CLASS, UNSPECIFIED OBESITY TYPE: ICD-10-CM

## 2025-04-01 DIAGNOSIS — R03.0 ELEVATED BLOOD PRESSURE READING WITHOUT DIAGNOSIS OF HYPERTENSION: ICD-10-CM

## 2025-04-01 RX ORDER — TIRZEPATIDE 2.5 MG/.5ML
INJECTION, SOLUTION SUBCUTANEOUS
Qty: 2 ML | Refills: 0 | OUTPATIENT
Start: 2025-04-01

## 2025-04-01 RX ORDER — TIRZEPATIDE 5 MG/.5ML
5 INJECTION, SOLUTION SUBCUTANEOUS
Qty: 2 ML | Refills: 1 | Status: CANCELLED | OUTPATIENT
Start: 2025-04-01

## 2025-04-01 NOTE — TELEPHONE ENCOUNTER
We typically will increase the dose month-to-month but if he is having good results and tolerating the lower dose well so far, we can stay there. If he finds he isn't noticing the benefit in the future, can increase dose as needed.    Isaias Fernandez,   4/1/2025 10:06 AM

## 2025-04-01 NOTE — TELEPHONE ENCOUNTER
Called patient and he has been doing very well with the 2.5. Not having any side effects on it. Wondering if he should stay on this dose since it is going well or if provider wants him to increase the dose.     Leigh Umanzor, CMA

## 2025-04-01 NOTE — TELEPHONE ENCOUNTER
Current dose of Zepbound 2.5 mg once weekly refilled.  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

## 2025-04-01 NOTE — TELEPHONE ENCOUNTER
Called and spoke with patient he will let us know if increased dose is needed.    Leigh Umanzor, CMA

## 2025-04-01 NOTE — TELEPHONE ENCOUNTER
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 Is he having any side effects with the 2.5 mg dose?    Isaias Fernandez DO  4/1/2025 7:49 AM

## 2025-04-02 ENCOUNTER — MYC REFILL (OUTPATIENT)
Dept: FAMILY MEDICINE | Facility: CLINIC | Age: 60
End: 2025-04-02
Payer: COMMERCIAL

## 2025-04-02 DIAGNOSIS — E66.9 OBESITY WITH SERIOUS COMORBIDITY, UNSPECIFIED CLASS, UNSPECIFIED OBESITY TYPE: ICD-10-CM

## 2025-04-02 DIAGNOSIS — R03.0 ELEVATED BLOOD PRESSURE READING WITHOUT DIAGNOSIS OF HYPERTENSION: ICD-10-CM

## 2025-04-02 DIAGNOSIS — E78.5 HYPERLIPIDEMIA LDL GOAL <100: ICD-10-CM

## 2025-04-03 NOTE — TELEPHONE ENCOUNTER
Prescription for Zepbound 5 mg once weekly sent to patient's pharmacy.      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

## 2025-05-19 ENCOUNTER — MYC MEDICAL ADVICE (OUTPATIENT)
Dept: FAMILY MEDICINE | Facility: CLINIC | Age: 60
End: 2025-05-19
Payer: COMMERCIAL

## 2025-05-19 DIAGNOSIS — E66.9 OBESITY WITH SERIOUS COMORBIDITY, UNSPECIFIED CLASS, UNSPECIFIED OBESITY TYPE: ICD-10-CM

## 2025-05-19 DIAGNOSIS — E78.5 HYPERLIPIDEMIA LDL GOAL <100: ICD-10-CM

## 2025-05-19 DIAGNOSIS — R03.0 ELEVATED BLOOD PRESSURE READING WITHOUT DIAGNOSIS OF HYPERTENSION: ICD-10-CM

## 2025-05-19 NOTE — TELEPHONE ENCOUNTER
LOV 2/11/25    5 mg ordered 4/3/25    Routing to provider to review and advise.     Khushi Bartlett RN   [facility]

## 2025-06-14 ENCOUNTER — HEALTH MAINTENANCE LETTER (OUTPATIENT)
Age: 60
End: 2025-06-14

## 2025-07-03 ENCOUNTER — TELEPHONE (OUTPATIENT)
Dept: FAMILY MEDICINE | Facility: CLINIC | Age: 60
End: 2025-07-03
Payer: COMMERCIAL

## 2025-07-03 DIAGNOSIS — E66.9 OBESITY WITH SERIOUS COMORBIDITY, UNSPECIFIED CLASS, UNSPECIFIED OBESITY TYPE: Primary | ICD-10-CM

## 2025-07-03 NOTE — TELEPHONE ENCOUNTER
Prior Authorization Retail Medication Request    Medication/Dose: Insurance needs a prior auth for Zepbound  Diagnosis and ICD code (if different than what is on RX):  E669  New/renewal/insurance change PA/secondary ins. PA:  Previously Tried and Failed:  unknown  Rationale:  unknown    Insurance   Primary: Keshia QUINTANA  Insurance ID:  [id number]    Secondary (if applicable):na  Insurance ID:  na    Thank You,  Nickie Alvarado, Pharm Tech   [facility] [place]

## 2025-09-03 DIAGNOSIS — E66.9 OBESITY WITH SERIOUS COMORBIDITY, UNSPECIFIED CLASS, UNSPECIFIED OBESITY TYPE: ICD-10-CM

## (undated) DEVICE — ENDO CATH BALLOON HERCULES W/O GW 15-16.5-18MM G48732

## (undated) DEVICE — INFLATION DEVICE BIG 60 ENDO-AN6012

## (undated) DEVICE — KIT ENDO TURNOVER/PROCEDURE W/CLEAN A SCOPE LINERS 103888

## (undated) DEVICE — ENDO FORCEP ENDOJAW BIOPSY 2.8MMX160CM FB-220K

## (undated) DEVICE — ENDO BITE BLOCK ADULT OLYMPUS LATEX FREE MAJ-1632

## (undated) RX ORDER — FENTANYL CITRATE 50 UG/ML
INJECTION, SOLUTION INTRAMUSCULAR; INTRAVENOUS
Status: DISPENSED
Start: 2021-08-25

## (undated) RX ORDER — FENTANYL CITRATE 50 UG/ML
INJECTION, SOLUTION INTRAMUSCULAR; INTRAVENOUS
Status: DISPENSED
Start: 2018-09-18

## (undated) RX ORDER — SIMETHICONE 40MG/0.6ML
SUSPENSION, DROPS(FINAL DOSAGE FORM)(ML) ORAL
Status: DISPENSED
Start: 2021-08-25